# Patient Record
Sex: FEMALE | NOT HISPANIC OR LATINO | Employment: STUDENT | URBAN - METROPOLITAN AREA
[De-identification: names, ages, dates, MRNs, and addresses within clinical notes are randomized per-mention and may not be internally consistent; named-entity substitution may affect disease eponyms.]

---

## 2021-06-09 ENCOUNTER — HOSPITAL ENCOUNTER (INPATIENT)
Facility: CLINIC | Age: 21
LOS: 6 days | Discharge: HOME | DRG: 885 | End: 2021-06-15
Attending: PSYCHIATRY & NEUROLOGY | Admitting: PSYCHIATRY & NEUROLOGY
Payer: COMMERCIAL

## 2021-06-09 DIAGNOSIS — F33.2 SEVERE RECURRENT MAJOR DEPRESSION WITHOUT PSYCHOTIC FEATURES (H): ICD-10-CM

## 2021-06-09 DIAGNOSIS — F32.A DEPRESSION WITH SUICIDAL IDEATION: ICD-10-CM

## 2021-06-09 DIAGNOSIS — F43.10 POSTTRAUMATIC STRESS DISORDER: ICD-10-CM

## 2021-06-09 DIAGNOSIS — Z11.52 ENCOUNTER FOR SCREENING LABORATORY TESTING FOR COVID-19 VIRUS: ICD-10-CM

## 2021-06-09 DIAGNOSIS — R45.851 DEPRESSION WITH SUICIDAL IDEATION: ICD-10-CM

## 2021-06-09 LAB
AMPHETAMINES UR QL SCN: NEGATIVE
BARBITURATES UR QL: NEGATIVE
BENZODIAZ UR QL: NEGATIVE
CANNABINOIDS UR QL SCN: POSITIVE
COCAINE UR QL: NEGATIVE
ETHANOL UR QL SCN: NEGATIVE
HCG UR QL: NEGATIVE
LABORATORY COMMENT REPORT: NORMAL
OPIATES UR QL SCN: NEGATIVE
SARS-COV-2 RNA RESP QL NAA+PROBE: NEGATIVE
SPECIMEN SOURCE: NORMAL

## 2021-06-09 PROCEDURE — 99284 EMERGENCY DEPT VISIT MOD MDM: CPT | Performed by: PSYCHIATRY & NEUROLOGY

## 2021-06-09 PROCEDURE — 80320 DRUG SCREEN QUANTALCOHOLS: CPT | Performed by: PSYCHIATRY & NEUROLOGY

## 2021-06-09 PROCEDURE — 250N000013 HC RX MED GY IP 250 OP 250 PS 637: Performed by: STUDENT IN AN ORGANIZED HEALTH CARE EDUCATION/TRAINING PROGRAM

## 2021-06-09 PROCEDURE — 124N000002 HC R&B MH UMMC

## 2021-06-09 PROCEDURE — 81025 URINE PREGNANCY TEST: CPT | Performed by: PSYCHIATRY & NEUROLOGY

## 2021-06-09 PROCEDURE — 80307 DRUG TEST PRSMV CHEM ANLYZR: CPT | Performed by: PSYCHIATRY & NEUROLOGY

## 2021-06-09 PROCEDURE — 99285 EMERGENCY DEPT VISIT HI MDM: CPT | Mod: 25 | Performed by: PSYCHIATRY & NEUROLOGY

## 2021-06-09 PROCEDURE — C9803 HOPD COVID-19 SPEC COLLECT: HCPCS | Performed by: PSYCHIATRY & NEUROLOGY

## 2021-06-09 PROCEDURE — 90791 PSYCH DIAGNOSTIC EVALUATION: CPT

## 2021-06-09 PROCEDURE — 87635 SARS-COV-2 COVID-19 AMP PRB: CPT | Performed by: PSYCHIATRY & NEUROLOGY

## 2021-06-09 RX ORDER — LEVONORGESTREL AND ETHINYL ESTRADIOL 0.15-0.03
1 KIT ORAL DAILY
Status: DISCONTINUED | OUTPATIENT
Start: 2021-06-10 | End: 2021-06-15 | Stop reason: HOSPADM

## 2021-06-09 RX ORDER — HYDROXYZINE HYDROCHLORIDE 25 MG/1
25 TABLET, FILM COATED ORAL EVERY 4 HOURS PRN
Status: DISCONTINUED | OUTPATIENT
Start: 2021-06-09 | End: 2021-06-15 | Stop reason: HOSPADM

## 2021-06-09 RX ORDER — LEVONORGESTREL AND ETHINYL ESTRADIOL 0.15-0.03
1 KIT ORAL DAILY
COMMUNITY
End: 2021-07-13

## 2021-06-09 RX ORDER — RAMELTEON 8 MG/1
8 TABLET ORAL AT BEDTIME
COMMUNITY

## 2021-06-09 RX ORDER — RAMELTEON 8 MG/1
8 TABLET ORAL AT BEDTIME
Status: DISCONTINUED | OUTPATIENT
Start: 2021-06-09 | End: 2021-06-15 | Stop reason: HOSPADM

## 2021-06-09 RX ORDER — ACETAMINOPHEN 325 MG/1
650 TABLET ORAL EVERY 4 HOURS PRN
Status: DISCONTINUED | OUTPATIENT
Start: 2021-06-09 | End: 2021-06-15 | Stop reason: HOSPADM

## 2021-06-09 RX ORDER — POLYETHYLENE GLYCOL 3350 17 G/17G
17 POWDER, FOR SOLUTION ORAL DAILY PRN
Status: DISCONTINUED | OUTPATIENT
Start: 2021-06-09 | End: 2021-06-15 | Stop reason: HOSPADM

## 2021-06-09 RX ADMIN — RAMELTEON 8 MG: 8 TABLET ORAL at 22:25

## 2021-06-09 ASSESSMENT — MIFFLIN-ST. JEOR: SCORE: 1622.28

## 2021-06-09 ASSESSMENT — ENCOUNTER SYMPTOMS
ENDOCRINE NEGATIVE: 1
DECREASED CONCENTRATION: 1
CARDIOVASCULAR NEGATIVE: 1
CONSTITUTIONAL NEGATIVE: 1
MUSCULOSKELETAL NEGATIVE: 1
GASTROINTESTINAL NEGATIVE: 1
NERVOUS/ANXIOUS: 1
SLEEP DISTURBANCE: 1
HALLUCINATIONS: 0
RESPIRATORY NEGATIVE: 1
HYPERACTIVE: 0
NEUROLOGICAL NEGATIVE: 1
EYES NEGATIVE: 1

## 2021-06-09 ASSESSMENT — ACTIVITIES OF DAILY LIVING (ADL)
LAUNDRY: WITH SUPERVISION
DRESS: SCRUBS (BEHAVIORAL HEALTH);INDEPENDENT
ORAL_HYGIENE: INDEPENDENT
HYGIENE/GROOMING: INDEPENDENT

## 2021-06-09 NOTE — ED NOTES
Per ems, patient is having suicidal thoughts. Having relationship issues. Was on a virtual visit with therapist when she told therapist about her thoughts

## 2021-06-09 NOTE — ED NOTES
ED to Behavioral Floor Handoff    SITUATION  Jacklyn Ho is a 20 year old female who speaks English and lives in a home with others The patient arrived in the ED by ambulance from home with a complaint of Suicidal  .The patient's current symptoms started/worsened 2 week(s) ago and during this time the symptoms have increased.   In the ED, pt was diagnosed with   Final diagnoses:   Depression with suicidal ideation        Initial vitals were: BP: 127/74  Pulse: 86  Temp: 98.1  F (36.7  C)  Resp: 18  SpO2: 100 %   --------  Is the patient diabetic? No   If yes, last blood glucose? --     If yes, was this treated in the ED? --  --------  Is the patient inebriated (ETOH) No or Impaired on other substances? No  MSSA done? N/A  Last MSSA score: --    Were withdrawal symptoms treated? N/A  Does the patient have a seizure history? No. If yes, date of most recent seizure--  --------  Is the patient patient experiencing suicidal ideation? SI, could not contract for safety with therapist; sleep problems; hx of suicide attempts in past  Homicidal ideation? denies current or recent homicidal ideation or behaviors.    Self-injurious behavior/urges? denies current or recent self injurious behavior or ideation.  ------  Was pt aggressive in the ED No  Was a code called No  Is the pt now cooperative? Yes  -------  Meds given in ED: Medications - No data to display   Family present during ED course? Yes  Family currently present? No    BACKGROUND  Does the patient have a cognitive impairment or developmental disability? No  Allergies: No Known Allergies.   Social demographics are   Social History     Socioeconomic History     Marital status: Single     Spouse name: Not on file     Number of children: Not on file     Years of education: Not on file     Highest education level: Not on file   Occupational History     Not on file   Social Needs     Financial resource strain: Not on file     Food insecurity     Worry: Not on file      Inability: Not on file     Transportation needs     Medical: Not on file     Non-medical: Not on file   Tobacco Use     Smoking status: Not on file   Substance and Sexual Activity     Alcohol use: Not on file     Drug use: Not on file     Sexual activity: Not on file   Lifestyle     Physical activity     Days per week: Not on file     Minutes per session: Not on file     Stress: Not on file   Relationships     Social connections     Talks on phone: Not on file     Gets together: Not on file     Attends Restoration service: Not on file     Active member of club or organization: Not on file     Attends meetings of clubs or organizations: Not on file     Relationship status: Not on file     Intimate partner violence     Fear of current or ex partner: Not on file     Emotionally abused: Not on file     Physically abused: Not on file     Forced sexual activity: Not on file   Other Topics Concern     Not on file   Social History Narrative     Not on file        ASSESSMENT  Labs results   Labs Ordered and Resulted from Time of ED Arrival Up to the Time of Departure from the ED   SARS-COV-2 (COVID-19) VIRUS RT-PCR   DRUG ABUSE SCREEN 6 CHEM DEP URINE ([facility])   HCG QUALITATIVE URINE      Imaging Studies: No results found for this or any previous visit (from the past 24 hour(s)).   Most recent vital signs /74   Pulse 86   Temp 98.1  F (36.7  C) (Oral)   Resp 18   LMP 06/09/2021   SpO2 100%    Abnormal labs/tests/findings requiring intervention:---   Pain control: pt had none  Nausea control: pt had none    RECOMMENDATION  Are any infection precautions needed (MRSA, VRE, etc.)? No If yes, what infection? --  ---  Does the patient have mobility issues? independently. If yes, what device does the pt use? ---  ---  Is patient on 72 hour hold or commitment? No If on 72 hour hold, have hold and rights been given to patient? N/A  Are admitting orders written if after 10 p.m. ?N/A  Tasks needing to be completed:---      Shruthi Gaming RN   [facility]--    [id number] [facility]

## 2021-06-09 NOTE — ED PROVIDER NOTES
ED Provider Note  [facility]      History     Chief Complaint   Patient presents with     Suicidal     HPI  Jacklyn Ho is a 20 year old female who is here via EMS from her dorm at [employer] as she told her therapist that she was feeling suicidal and cannot safety contract. Patient reports feeling overwhelmed, that she has no support here. She comes from [place] to attend [employer]. Due to the Covid pandemic and need for social distancing, she has never been able to connect to her roommates and feels that they are strangers to her. She has a boyfriend. Patient has not bene hospitalized previously. She has been diagnosed with depression and anxiety by her current therapist/PA provider. She admits to seeing shadows and things on the periphery of her vision otherwise denies hearing voices or other psychotic concerns. Patient denies drug use or drinking. She presently feels unsafe being in the community and would like to be hospitalized.    Patient denies acute medical concerns. She denies COVID symptoms and had her first Covid shot ([place]) 3 weeks ago and is due for her second shot in 3 days.    Please see DEC Crisis Assessment on 6/9/21 in Epic for further details.     PERSONAL MEDICAL HISTORY  No past medical history on file.  PAST SURGICAL HISTORY  No past surgical history on file.  FAMILY HISTORY  No family history on file.  SOCIAL HISTORY  Social History     Tobacco Use     Smoking status: Not on file   Substance Use Topics     Alcohol use: Not on file     MEDICATIONS  No current facility-administered medications for this encounter.      No current outpatient medications on file.     ALLERGIES  No Known Allergies       Review of Systems   Constitutional: Negative.    HENT: Negative.    Eyes: Negative.    Respiratory: Negative.    Cardiovascular: Negative.    Gastrointestinal: Negative.    Endocrine: Negative.    Genitourinary: Negative.    Musculoskeletal:  Negative.    Skin: Negative.    Neurological: Negative.    Psychiatric/Behavioral: Positive for decreased concentration, sleep disturbance and suicidal ideas. Negative for hallucinations. The patient is nervous/anxious. The patient is not hyperactive.    All other systems reviewed and are negative.        Physical Exam   BP: 127/74  Pulse: 86  Temp: 98.1  F (36.7  C)  Resp: 18  SpO2: 100 %  Physical Exam  Vitals signs and nursing note reviewed.   HENT:      Head: Normocephalic.   Eyes:      Pupils: Pupils are equal, round, and reactive to light.   Neck:      Musculoskeletal: Normal range of motion.   Pulmonary:      Effort: Pulmonary effort is normal.   Musculoskeletal: Normal range of motion.   Neurological:      General: No focal deficit present.      Mental Status: She is alert.   Psychiatric:         Attention and Perception: Attention and perception normal. She does not perceive auditory or visual hallucinations.         Mood and Affect: Affect normal. Mood is anxious and depressed.         Speech: Speech normal.         Behavior: Behavior is withdrawn. Behavior is not agitated, aggressive, hyperactive or combative. Behavior is cooperative.         Thought Content: Thought content is not paranoid or delusional. Thought content includes suicidal ideation. Thought content does not include homicidal ideation.         Cognition and Memory: Cognition and memory normal.         Judgment: Judgment normal.         ED Course      Procedures             No results found for any visits on 06/09/21.  Medications - No data to display     Assessments & Plan (with Medical Decision Making)   Patient with depression who is feeling overwhelmed and now acutely suicidal and unsafe. She feels helpless, hopeless and feels she needs to be hospitalized. She is referred. She is voluntary.    I have reviewed the nursing notes. I have reviewed the findings, diagnosis, plan and need for follow up with the patient.    New Prescriptions     No medications on file       Final diagnoses:   Depression with suicidal ideation       --  Johnny Henderson MD  [facility] EMERGENCY DEPARTMENT  6/9/2021     Johnny Henderson MD  06/09/21 3429

## 2021-06-09 NOTE — PHARMACY-ADMISSION MEDICATION HISTORY
Admission Medication History Completed by Pharmacy    See [facility] Admission Navigator for allergy information, preferred outpatient pharmacy, prior to admission medications and immunization status.     Medication History Sources:     Patient, Dispense Report, Care Everywhere    Changes made to PTA medication list (reason):    Added:     Nordette (per Care Everywhere, confirmed with patient)    Ramelteon (per Dispense Report, confirmed with patient)    Sertraline (per Dispense Report, confirmed with patient)    Deleted: None    Changed: None    Additional Information:    Patient is knowledgeable of medication use. Patient also notes that she was recently prescribed a ketoconazole 2% shampoo, but has not yet picked it up.    Prior to Admission medications    Medication Sig Last Dose Taking? Auth Provider   levonorgestrel-ethinyl estradiol (NORDETTE) 0.15-30 MG-MCG tablet Take 1 tablet by mouth daily 6/8/2021 Yes Unknown, Entered By History   ramelteon (ROZEREM) 8 MG tablet Take 8 mg by mouth At Bedtime 6/8/2021 at PM Yes Unknown, Entered By History   sertraline (ZOLOFT) 50 MG tablet Take 50 mg by mouth daily 6/9/2021 at AM Yes Unknown, Entered By History       Date completed: 06/09/21    Medication history completed by: Oscar Brewer

## 2021-06-09 NOTE — ED TRIAGE NOTES
Pt told her therapist via zoom that the was feeling suicidal thoughts but denied any attempts.  Pt states she recently has been having relationship issues and its making her feeling this way.

## 2021-06-09 NOTE — ED NOTES
Pt arrived in [facility], RN explained the process. Pt calm, cooperative at this time. Given sandwich and some snacks, reports she has not eaten for two days
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
## 2021-06-09 NOTE — ED NOTES
Bed: ED16  Expected date: 6/9/21  Expected time: 2:10 PM  Means of arrival: Ambulance  Comments:  .[employer], 19yo female suicidal

## 2021-06-10 LAB
ANION GAP SERPL CALCULATED.3IONS-SCNC: 5 MMOL/L (ref 3–14)
BUN SERPL-MCNC: 9 MG/DL (ref 7–30)
CALCIUM SERPL-MCNC: 9.2 MG/DL (ref 8.5–10.1)
CHLORIDE SERPL-SCNC: 110 MMOL/L (ref 94–109)
CO2 SERPL-SCNC: 26 MMOL/L (ref 20–32)
CREAT SERPL-MCNC: 0.99 MG/DL (ref 0.52–1.04)
ERYTHROCYTE [DISTWIDTH] IN BLOOD BY AUTOMATED COUNT: 11.9 % (ref 10–15)
GFR SERPL CREATININE-BSD FRML MDRD: 82 ML/MIN/{1.73_M2}
GLUCOSE SERPL-MCNC: 85 MG/DL (ref 70–99)
HCT VFR BLD AUTO: 40.7 % (ref 35–47)
HGB BLD-MCNC: 13.5 G/DL (ref 11.7–15.7)
MCH RBC QN AUTO: 30.6 PG (ref 26.5–33)
MCHC RBC AUTO-ENTMCNC: 33.2 G/DL (ref 31.5–36.5)
MCV RBC AUTO: 92 FL (ref 78–100)
PLATELET # BLD AUTO: 267 10E9/L (ref 150–450)
POTASSIUM SERPL-SCNC: 4.3 MMOL/L (ref 3.4–5.3)
RBC # BLD AUTO: 4.41 10E12/L (ref 3.8–5.2)
SODIUM SERPL-SCNC: 141 MMOL/L (ref 133–144)
TSH SERPL DL<=0.005 MIU/L-ACNC: 0.72 MU/L (ref 0.4–4)
VIT B12 SERPL-MCNC: 649 PG/ML (ref 193–986)
WBC # BLD AUTO: 5.4 10E9/L (ref 4–11)

## 2021-06-10 PROCEDURE — 82607 VITAMIN B-12: CPT | Performed by: STUDENT IN AN ORGANIZED HEALTH CARE EDUCATION/TRAINING PROGRAM

## 2021-06-10 PROCEDURE — 85027 COMPLETE CBC AUTOMATED: CPT | Performed by: STUDENT IN AN ORGANIZED HEALTH CARE EDUCATION/TRAINING PROGRAM

## 2021-06-10 PROCEDURE — 250N000013 HC RX MED GY IP 250 OP 250 PS 637: Performed by: STUDENT IN AN ORGANIZED HEALTH CARE EDUCATION/TRAINING PROGRAM

## 2021-06-10 PROCEDURE — 124N000002 HC R&B MH UMMC

## 2021-06-10 PROCEDURE — 80048 BASIC METABOLIC PNL TOTAL CA: CPT | Performed by: STUDENT IN AN ORGANIZED HEALTH CARE EDUCATION/TRAINING PROGRAM

## 2021-06-10 PROCEDURE — G0177 OPPS/PHP; TRAIN & EDUC SERV: HCPCS

## 2021-06-10 PROCEDURE — 84443 ASSAY THYROID STIM HORMONE: CPT | Performed by: STUDENT IN AN ORGANIZED HEALTH CARE EDUCATION/TRAINING PROGRAM

## 2021-06-10 PROCEDURE — 36415 COLL VENOUS BLD VENIPUNCTURE: CPT | Performed by: STUDENT IN AN ORGANIZED HEALTH CARE EDUCATION/TRAINING PROGRAM

## 2021-06-10 PROCEDURE — 99223 1ST HOSP IP/OBS HIGH 75: CPT | Mod: AI | Performed by: PSYCHIATRY & NEUROLOGY

## 2021-06-10 RX ADMIN — LEVONORGESTREL AND ETHINYL ESTRADIOL 1 TABLET: KIT at 09:46

## 2021-06-10 RX ADMIN — RAMELTEON 8 MG: 8 TABLET ORAL at 20:53

## 2021-06-10 RX ADMIN — SERTRALINE HYDROCHLORIDE 50 MG: 50 TABLET ORAL at 09:45

## 2021-06-10 RX ADMIN — HYDROXYZINE HYDROCHLORIDE 25 MG: 25 TABLET, FILM COATED ORAL at 10:33

## 2021-06-10 ASSESSMENT — ACTIVITIES OF DAILY LIVING (ADL)
LAUNDRY: WITH SUPERVISION
HYGIENE/GROOMING: INDEPENDENT
DRESS: SCRUBS (BEHAVIORAL HEALTH)
ORAL_HYGIENE: INDEPENDENT
HYGIENE/GROOMING: INDEPENDENT
DRESS: SCRUBS (BEHAVIORAL HEALTH)
ORAL_HYGIENE: INDEPENDENT

## 2021-06-10 ASSESSMENT — MIFFLIN-ST. JEOR: SCORE: 1620.01

## 2021-06-10 NOTE — PROGRESS NOTES
"   06/10/21 1310   General Information   Date Initially Attended OT 06/10/21     Date of Service: Dee 10, 2021    Description: Jacklyn attended 1 occupational therapy group today.   11:15 - 12:00. 7 total participants. Occupational Therapy Clinic. Purpose: Coping skill exploration, creative expression within personally meaningful activities, and clinical observation of social, cognitive, and kinesthetic performance skills.  Pt Response: Chosen activity: Creative expression painting. Stated \"I like to paint because it doesn't require any skills\". Congruent-pleasant affect. I to initiate, gather materials, sequence and adjust to workspace demands as needed. Demonstrated good focus, planning, and problem solving for this moderately complex task. Able to ask for assistance and appeared comfortable interacting with peers and staff.    Assessment: Demonstrates benefit from engagement in OT groups that support healthy recovery, specifically exploration of positive coping skills for symptom management, relapse prevention, and resumption of personal roles, routines and daily occupations.    Plan: OT staff will meet with pt to review the role of occupational therapy and explain the value of having them involved in their treatment plan including options to meet current needs/self-identified goals. As group attendance is established,  continued clinical observations will be made and Pt will be given self-assessment to inform OT initial assessment. Continue graded occupation-based activities for increased success towards goal and ongoing assessment.     Aneta Figueroa, OT on 6/10/2021 at 1:10 PM    "

## 2021-06-10 NOTE — PLAN OF CARE
The patient presents as calm,soft spoken and generally passive this morning. She took her morning medications without any issues and attended groups appropriately. At one point she indicated escalating anxiety and requested PRN hydroxyzine. She expressed that she would also work on coping skills for dealing with the stress of hospitalization. The patient is otherwise calm, appropriate and engaged on the unit throughout the day. She indicates poor sleep as a major concern and notes she often has trouble sleeping in new environments. She denies SI/SIB/HI today. She denies any acute physical health concerns or side effects from medications. Her VS were WDL this shift.

## 2021-06-10 NOTE — PROGRESS NOTES
Behavioral Health  Note   Behavioral Health  Spirituality Group Note     Unit 20    Name: Jacklyn Ho    YOB: 2000   MRN: [id number]    Age: 20 year old     Patient attended -led group, which included discussion of spirituality, coping with illness and building resilience.   Patient attended group for 1.0 hrs.   patient demonstrated an appreciation of topic's application for their personal circumstances.     [facility]  Staff    Page [phone]
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
## 2021-06-10 NOTE — PLAN OF CARE
Problem: Behavioral Health Plan of Care  Goal: Patient-Specific Goal (Individualization)  Flowsheets (Taken 6/10/2021 0954)  Patient Vulnerabilities:   adverse childhood experience(s)   traumatic event   lacks insight into illness  Patient Personal Strengths:   ability to maintain sobriety   motivated for recovery   independent living skills   realistic evaluation of current/future capabilities   intellectual cognitive skills   expressive of emotions   positive educational history   family/social support  Note:   Personal Plan of Care    Patient goals:  Feel stable and safe with self   Get referrals for outpatient care    Plan for admission:  1. stabilization of mood disorder symptoms.   2. Absence of SI/Safe with self  3. Medication management per Mds  4. Coordination of Care with outpatient providers/family  5. Psychiatric follow up care in place

## 2021-06-10 NOTE — PLAN OF CARE
BEHAVIORAL TEAM DISCUSSION    Participants: Dr. Dunham, Psychiatry Student, Med Student, Robin ESCOBAR, Ninoska Waller CTC  Progress: New Patient   Anticipated length of stay: 3-5 days  Continued Stay Criteria/Rationale: SI, needs clinical stabilization and medication management.  Medical/Physical: See H&P note  Precautions:   Behavioral Orders   Procedures    Code 1 - Restrict to Unit    Discontinue 1:1 attendant for suicide risk     Order Specific Question:   I have performed an in person assessment of the patient     Answer:   Based on this assessment the patient no longer requires a one on one attendant at this point in time.     Order Specific Question:   Rationale     Answer:   Patient States able to remain safe in hospital    Routine Programming     As clinically indicated    Single Room    Status 15     Every 15 minutes.    Suicide precautions     Patients on Suicide Precautions should have a Combination Diet ordered that includes a Diet selection(s) AND a Behavioral Tray selection for Safe Tray - with utensils, or Safe Tray - NO utensils       Plan: Patient will have ongoing psychiatric assessment. Medications will be reviewed and adjusted per MD as indicated. Outpatient providers will be contacted for care coordination. Patient will be encouraged to participate in unit groups and activities. CTC will continue to assess needs and  ensure appropriate follow up care is in place.   Rationale for change in precautions or plan: None

## 2021-06-10 NOTE — PROGRESS NOTES
06/09/21 2053   Patient Belongings   Did you bring any home meds/supplements to the hospital?  No   Patient Belongings locker;other (see comments)   Patient Belongings Remaining with Patient other (see comments)   Patient Belongings Put in Hospital Secure Location (Security or Locker, etc.) other (see comments)   Belongings Search Yes   Clothing Search Yes   Second Staff Adwoa HA (PsyA) and Sana (Rn) unit 20N       Pt belongings in locker on unit 20N  1 shirt, 1 pair shorts, 1 pair sweatpants, 1 pair adidas slides, 1 pair underwear, 1 mask, 3 hairties, 1 iphone, 1 student ID, 1 lanyard with 4 keys, 1 self defense stick,1 ring      .A               Admission:  I am responsible for any personal items that are not sent to the safe or pharmacy.  [facility] is not responsible for loss, theft or damage of any property in my possession.    Signature:  _________________________________ Date: _______  Time: _____                                              Staff Signature:  ____________________________ Date: ________  Time: _____      2nd Staff person, if patient is unable/unwilling to sign:    Signature: ________________________________ Date: ________  Time: _____     Discharge:  [facility] has returned all of my personal belongings:    Signature: _________________________________ Date: ________  Time: _____                                          Staff Signature:  ____________________________ Date: ________  Time: _____
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
## 2021-06-10 NOTE — PLAN OF CARE
Jacklyn appeared to sleep a total of 7 hours this night shift.  No prns or snacks given or requested.
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
## 2021-06-11 PROCEDURE — 250N000013 HC RX MED GY IP 250 OP 250 PS 637: Performed by: STUDENT IN AN ORGANIZED HEALTH CARE EDUCATION/TRAINING PROGRAM

## 2021-06-11 PROCEDURE — 250N000013 HC RX MED GY IP 250 OP 250 PS 637: Performed by: PSYCHIATRY & NEUROLOGY

## 2021-06-11 PROCEDURE — G0177 OPPS/PHP; TRAIN & EDUC SERV: HCPCS

## 2021-06-11 PROCEDURE — 124N000002 HC R&B MH UMMC

## 2021-06-11 PROCEDURE — 99232 SBSQ HOSP IP/OBS MODERATE 35: CPT | Mod: GC | Performed by: PSYCHIATRY & NEUROLOGY

## 2021-06-11 RX ORDER — FLUOXETINE 10 MG/1
10 CAPSULE ORAL DAILY
Status: DISCONTINUED | OUTPATIENT
Start: 2021-06-11 | End: 2021-06-15 | Stop reason: HOSPADM

## 2021-06-11 RX ADMIN — LEVONORGESTREL AND ETHINYL ESTRADIOL 1 TABLET: KIT at 08:01

## 2021-06-11 RX ADMIN — RAMELTEON 8 MG: 8 TABLET ORAL at 20:10

## 2021-06-11 RX ADMIN — HYDROXYZINE HYDROCHLORIDE 25 MG: 25 TABLET, FILM COATED ORAL at 13:21

## 2021-06-11 RX ADMIN — FLUOXETINE 10 MG: 10 CAPSULE ORAL at 13:20

## 2021-06-11 RX ADMIN — SERTRALINE HYDROCHLORIDE 50 MG: 50 TABLET ORAL at 08:01

## 2021-06-11 ASSESSMENT — ACTIVITIES OF DAILY LIVING (ADL)
LAUNDRY: WITH SUPERVISION
HYGIENE/GROOMING: INDEPENDENT
DRESS: INDEPENDENT
HYGIENE/GROOMING: INDEPENDENT
ORAL_HYGIENE: INDEPENDENT
ORAL_HYGIENE: INDEPENDENT
DRESS: INDEPENDENT

## 2021-06-11 NOTE — CONSULTS
Consult acknowledged. Discussed with CTC.   Patient is able to complete assessment as an Outpatient appointment. CTC will call Intake at [phone] to schedule an appointment.        SELMA Cabezas, [facility]  [facility]  Phone: [phone]

## 2021-06-11 NOTE — PLAN OF CARE
Assessment/Intervention/Current Symptoms and Care Coordination: Met with team. Reviewed patient's chart and progress notes. Writer called [facility] at  [phone]. Writer left a vm requesting a call back. Patient was referred to the [facility]. Writer was informed by [facility] , Lucrecia, that Intake appointment will be completed as outpatient.          Discharge Plan or Goal: Will return home. Will follow up with outpatient providers.          Barriers to Discharge: Needs clinical stabilization and medication management.               Referral Status: Was referred to the [facility].          Legal Status: Voluntary

## 2021-06-11 NOTE — PROGRESS NOTES
Psychoeducation Group  Total Time in Group: 45 minutes    Number of patients in group: 5     Scope of service: Use of humor as a coping technique with distress. Pt engaged throughout the intervention. Mood: Flat. Pt reported she was anxious and tired. She reported she slept this afternoon, and it might affect her night sleep. Affect was flat. Denied suicidal ideation. Denied thoughts to harm self. She left before the group was over, immediately after another patient walked into the group room.     Patient progress: Pt voiced understanding of strategies and techniques discussed.      Patient response/reaction to treatment intervention(s): This patient attended this group session focused on use of appropriate humor as a coping tool with distress. Practiced ways to apply humor to specific situations. Discussed how humor helps distract her from anxiety and other tense emotions. Received positive feedback from writer and group members. Pt did not voice understanding of the techniques, as she left the group immediately another male patient came into the room.

## 2021-06-11 NOTE — PLAN OF CARE
Denies suicidal thoughts and thoughts of self harm.  C/o increasing anxiety after lunch today.  Requested and received PRN Hydroxyzine with decrease in anxiety.  Attended groups today, social with peers, affect flat, brightens on approach.  Has been making and receiving multiple phone calls throughout the day. Pleasant and cooperative.     Problem: Suicide Risk  Goal: Absence of Self-Harm  Outcome: No Change     Problem: Suicidal Behavior  Goal: Suicidal Behavior is Absent or Managed  Outcome: No Change

## 2021-06-11 NOTE — PLAN OF CARE
Date of Service: June 11, 2021    Description: Jacklyn attended 3 hours of  occupational therapy groups today.Overall, pt was pleasant and presented with a brightened affect.    10:15 - 12:00. 8 total participants. Occupational Therapy Clinic. Purpose: Coping skill exploration, creative expression within personally meaningful activities, and clinical observation of social, cognitive, and kinesthetic performance skills. Pt Response: Chosen activity: created a water color painting and made a beaded bracelet. Demonstrated consistent performance and social skills as observed on previous dates, including engaging with peers and fair sequencing.      1:15 - 2:00. 6 total participants.Collaborative multi-step hands-on meal preparation group. Education was provided on cooking/baking as a significant occupation for role and routine fulfillment, delayed gratification and socialization.  Pt Response: Reportedly enjoys baking for friends and family. Demonstrated fair process, performance, and collaborative social interaction skills, specifically leadership and inclusion of peers.    Assessment: Demonstrates benefit from engagement in OT groups that support healthy recovery, specifically exploration of positive coping skills for symptom management, relapse prevention, and resumption of personal roles, routines and daily occupations.    Plan: Continue graded occupation-based activities for increased success towards goal and ongoing assessment.     Linda Mondragon on 6/11/2021 at 2:47 PM    Supervising Occupational Therapist:  Aneta Figueroa OT on 6/11/2021 at 3:03 PM

## 2021-06-11 NOTE — PLAN OF CARE
Problem: Behavioral Health Plan of Care  Goal: Plan of Care Review  Outcome: Improving  Flowsheets (Taken 6/11/2021 1600)  Plan of Care Reviewed With: patient  Patient Agreement with Plan of Care: agrees  Goal: Adheres to Safety Considerations for Self and Others  Outcome: Improving  Goal: Absence of New-Onset Illness or Injury  Outcome: Improving  Goal: Optimized Coping Skills in Response to Life Stressors  Outcome: Improving     Patient in room sleeping when received this shift, pleasant on approach, denies anxiety and depression, no SI/SIB/hallucinations, denies pain, deana for safety, safety checks in place every 15 minutes per unit policy,eating and drinking well, had a visitor and visit went well,  offers no other concerns at this time, will continue to offer support as needed per plan of care

## 2021-06-11 NOTE — PLAN OF CARE
Mauri appeared to sleep a total of 7 hours this night shift.  No prns or snacks given or requested.

## 2021-06-12 PROCEDURE — 124N000002 HC R&B MH UMMC

## 2021-06-12 PROCEDURE — 250N000013 HC RX MED GY IP 250 OP 250 PS 637: Performed by: STUDENT IN AN ORGANIZED HEALTH CARE EDUCATION/TRAINING PROGRAM

## 2021-06-12 PROCEDURE — 250N000013 HC RX MED GY IP 250 OP 250 PS 637: Performed by: PSYCHIATRY & NEUROLOGY

## 2021-06-12 RX ORDER — LANOLIN ALCOHOL/MO/W.PET/CERES
6 CREAM (GRAM) TOPICAL
Status: DISCONTINUED | OUTPATIENT
Start: 2021-06-12 | End: 2021-06-15 | Stop reason: HOSPADM

## 2021-06-12 RX ADMIN — RAMELTEON 8 MG: 8 TABLET ORAL at 20:42

## 2021-06-12 RX ADMIN — MELATONIN TAB 3 MG 6 MG: 3 TAB at 20:42

## 2021-06-12 RX ADMIN — FLUOXETINE 10 MG: 10 CAPSULE ORAL at 07:56

## 2021-06-12 RX ADMIN — LEVONORGESTREL AND ETHINYL ESTRADIOL 1 TABLET: KIT at 07:56

## 2021-06-12 RX ADMIN — HYDROXYZINE HYDROCHLORIDE 25 MG: 25 TABLET, FILM COATED ORAL at 20:42

## 2021-06-12 ASSESSMENT — ACTIVITIES OF DAILY LIVING (ADL)
DRESS: INDEPENDENT
LAUNDRY: WITH SUPERVISION
ORAL_HYGIENE: INDEPENDENT
HYGIENE/GROOMING: INDEPENDENT
HYGIENE/GROOMING: INDEPENDENT
ORAL_HYGIENE: INDEPENDENT
DRESS: INDEPENDENT

## 2021-06-12 NOTE — PLAN OF CARE
Pt attended mental health education group. Gave group options regarding topic, the group chose to do random questions. Discussed multiple topics, most common was their relationship to others and connection level, regrets and goals.     Pt actively participated, expressed future orientation.

## 2021-06-12 NOTE — PLAN OF CARE
Problem: Adult Inpatient Plan of Care  Goal: Absence of Hospital-Acquired Illness or Injury  Outcome: Improving  Goal: Optimal Comfort and Wellbeing  Outcome: Improving     Problem: Suicide Risk  Goal: Absence of Self-Harm  Outcome: Improving     Patient up and visible on the unit when received this shift, playing cards with peers, pleasant on approach, endorsed anxiety and depression, medicated with PRN Atarax, no SI/SIB/hallucination, denies pain, deana for safety, safety precautions in place every 15 minutes, eating and drinking well, melatonin PRN given per patient's request, patient has no other known concerns at his time, will offer support as needed per plan of care

## 2021-06-12 NOTE — PLAN OF CARE
Visible in the milieu, social with peers. Denies suicidal thoughts, thoughts of self ham and hallucinations.  Reports mood as being good. Affect flat, brightens with social interactions. Full range affect this afternoon after visiting with a her boyfriend.   Reports difficulty staying asleep at night, reports wakes up frequently throughout the night.  States Rozerem helps her fall asleep, requesting to have Melatonin added to help her stay asleep.  On call care team informed.    Problem: Suicide Risk  Goal: Absence of Self-Harm  Outcome: No Change     Problem: Suicidal Behavior  Goal: Suicidal Behavior is Absent or Managed  Outcome: No Change

## 2021-06-13 PROCEDURE — 250N000013 HC RX MED GY IP 250 OP 250 PS 637: Performed by: PSYCHIATRY & NEUROLOGY

## 2021-06-13 PROCEDURE — 250N000013 HC RX MED GY IP 250 OP 250 PS 637: Performed by: STUDENT IN AN ORGANIZED HEALTH CARE EDUCATION/TRAINING PROGRAM

## 2021-06-13 PROCEDURE — 124N000002 HC R&B MH UMMC

## 2021-06-13 RX ADMIN — MELATONIN TAB 3 MG 6 MG: 3 TAB at 21:19

## 2021-06-13 RX ADMIN — FLUOXETINE 10 MG: 10 CAPSULE ORAL at 07:51

## 2021-06-13 RX ADMIN — LEVONORGESTREL AND ETHINYL ESTRADIOL 1 TABLET: KIT at 07:52

## 2021-06-13 RX ADMIN — RAMELTEON 8 MG: 8 TABLET ORAL at 21:19

## 2021-06-13 ASSESSMENT — ACTIVITIES OF DAILY LIVING (ADL)
ORAL_HYGIENE: INDEPENDENT
HYGIENE/GROOMING: INDEPENDENT
LAUNDRY: WITH SUPERVISION
DRESS: INDEPENDENT;SCRUBS (BEHAVIORAL HEALTH)
HYGIENE/GROOMING: INDEPENDENT
DRESS: INDEPENDENT;SCRUBS (BEHAVIORAL HEALTH)
ORAL_HYGIENE: INDEPENDENT

## 2021-06-13 ASSESSMENT — MIFFLIN-ST. JEOR: SCORE: 1620.01

## 2021-06-13 NOTE — PLAN OF CARE
"Denies suicidal thoughts and thoughts of self harm. Has full range affect, calm and cooperative.   Reports anxiety and depression improving.  Rates anxiety 5/10 and depression 6/10.  Reports sleeping better last night, states she feels the \"quality\" of sleep improved with the added Melatonin.  States she still woke up several times but was able to fall back to sleep quickly.   Has been out in the milieu, social with peers, talking on the phone, watching movies and coloring.     Problem: Adult Inpatient Plan of Care  Goal: Plan of Care Review  Outcome: Improving  Flowsheets (Taken 6/13/2021 1047)  Plan of Care Reviewed With: patient  Goal: Patient-Specific Goal (Individualized)  Outcome: Improving  Goal: Absence of Hospital-Acquired Illness or Injury  Outcome: Improving  Goal: Optimal Comfort and Wellbeing  Outcome: Improving  Goal: Readiness for Transition of Care  Outcome: Improving     Problem: Suicide Risk  Goal: Absence of Self-Harm  Outcome: Improving     Problem: Behavioral Health Plan of Care  Goal: Plan of Care Review  Outcome: Improving  Flowsheets (Taken 6/13/2021 1047)  Plan of Care Reviewed With: patient  Patient Agreement with Plan of Care: agrees  Goal: Patient-Specific Goal (Individualization)  Outcome: Improving  Note:     Goal: Adheres to Safety Considerations for Self and Others  Outcome: Improving  Goal: Absence of New-Onset Illness or Injury  Outcome: Improving  Goal: Optimized Coping Skills in Response to Life Stressors  Outcome: Improving  Goal: Develops/Participates in Therapeutic [place] to Support Successful Transition  Outcome: Improving     Problem: Suicidal Behavior  Goal: Suicidal Behavior is Absent or Managed  Outcome: Improving     "

## 2021-06-13 NOTE — PLAN OF CARE
Problem: Behavioral Health Plan of Care  Goal: Plan of Care Review  Outcome: Improving  Flowsheets (Taken 6/13/2021 1600)  Plan of Care Reviewed With: patient  Patient Agreement with Plan of Care: agrees  Goal: Adheres to Safety Considerations for Self and Others  Outcome: Improving  Goal: Absence of New-Onset Illness or Injury  Outcome: Improving  Goal: Optimized Coping Skills in Response to Life Stressors  Outcome: Improving     Patient in room when received this shift, pleasant on approach, denies all psych symptoms, no SI/SIB/hallucinations, patient verbalized to contract for safety, safety precautions in place every 15 minutes, denies  pain, medication compliant, eating and drinking well, engaged and social with peers in the milieu, no other known concerns at this time, will continue to offer support as needed for the rest of the shift.

## 2021-06-14 PROCEDURE — 250N000013 HC RX MED GY IP 250 OP 250 PS 637: Performed by: STUDENT IN AN ORGANIZED HEALTH CARE EDUCATION/TRAINING PROGRAM

## 2021-06-14 PROCEDURE — 124N000002 HC R&B MH UMMC

## 2021-06-14 PROCEDURE — 99232 SBSQ HOSP IP/OBS MODERATE 35: CPT | Mod: GC | Performed by: PSYCHIATRY & NEUROLOGY

## 2021-06-14 PROCEDURE — 250N000013 HC RX MED GY IP 250 OP 250 PS 637: Performed by: PSYCHIATRY & NEUROLOGY

## 2021-06-14 PROCEDURE — G0177 OPPS/PHP; TRAIN & EDUC SERV: HCPCS

## 2021-06-14 RX ORDER — FLUOXETINE 10 MG/1
10 CAPSULE ORAL DAILY
Qty: 30 CAPSULE | Refills: 0 | Status: ON HOLD | OUTPATIENT
Start: 2021-06-15 | End: 2021-07-20

## 2021-06-14 RX ORDER — HYDROXYZINE HYDROCHLORIDE 25 MG/1
25 TABLET, FILM COATED ORAL EVERY 4 HOURS PRN
Qty: 30 TABLET | Refills: 0 | Status: ON HOLD | OUTPATIENT
Start: 2021-06-14 | End: 2021-07-20

## 2021-06-14 RX ADMIN — LEVONORGESTREL AND ETHINYL ESTRADIOL 1 TABLET: KIT at 08:07

## 2021-06-14 RX ADMIN — FLUOXETINE 10 MG: 10 CAPSULE ORAL at 08:06

## 2021-06-14 RX ADMIN — HYDROXYZINE HYDROCHLORIDE 25 MG: 25 TABLET, FILM COATED ORAL at 10:14

## 2021-06-14 RX ADMIN — MELATONIN TAB 3 MG 6 MG: 3 TAB at 21:37

## 2021-06-14 RX ADMIN — RAMELTEON 8 MG: 8 TABLET ORAL at 21:37

## 2021-06-14 ASSESSMENT — ACTIVITIES OF DAILY LIVING (ADL)
DRESS: INDEPENDENT;SCRUBS (BEHAVIORAL HEALTH)
LAUNDRY: WITH SUPERVISION
HYGIENE/GROOMING: INDEPENDENT
ORAL_HYGIENE: INDEPENDENT

## 2021-06-14 NOTE — PLAN OF CARE
Pt is pleasant, cooperative and med compliant.  Pt has a full-range affect. Pt rates anxiety 5/10 and depression 6/10.  Pt denies any SI but admits to thoughts of wanting to run into a wall and give herself a concussion.  Pt contracts for safety but states concerns of those thoughts continuing if discharged from the hospital.  Pt feels safer out on the milieu and doesn't feel like she can trust herself alone in her room.  Pt states she doesn't have any coping mechanisms or any hobbies.  Writer provided pt with a journal and pt agreed to write down her thoughts to see if this helps her cope.  Pt is social on unit.  Pt requests prn hydroxyzine for anxiety rated 10/10.  Pt remains on the unit and avoids her room to avoid self-harm.  Pt attends groups. Pt starting journaling and states it is helping her.  Pt expresses group being interesting for her because they discussed coping mechanisms and she found it helpful.

## 2021-06-14 NOTE — PLAN OF CARE
Assessment/Intervention/Current Symptoms and Care Coordination: Met with team. Reviewed patient's chart and progress notes. Writer called [facility] at  [phone]. Writer scheduled a psychiatry appt for 6/16/2021 1:00 PM. Writer scheduled [facility] intake appointment for 6/16/2021 at 3:00 PM.           Discharge Plan or Goal: Will return home. Will follow up with outpatient psychiatrist at [facility]. Will follow up with [facility] Care Coordinator.            Barriers to Discharge: Needs clinical stabilization and medication management.                  Referral Status: Was referred to the [facility].            Legal Status: Voluntary

## 2021-06-14 NOTE — PLAN OF CARE
Problem: OT General Care Plan  Goal: OT Goal 1  Description: With min assist, Pt will demonstrate illness management skills necessary for positive resumption of home and community roles by discharge.    Date of Service: June 14, 2021    Description: Jacklyn attended 3 occupational therapy groups today.     10:15 - 11:00. Pt attended <30 mins in a chair yoga practice - No Charge.     11:15 - 12:00. 7 total participants. Occupational Therapy Clinic. Purpose: Coping skill exploration, creative expression within personally meaningful activities, and clinical observation of social, cognitive, and kinesthetic performance skills. Pt Response: Chosen activity: Detailed canvas painting. Demonstrated consistent performance and social skills as observed on previous dates as she was fully engaged and bright / social with peers.   1:15 - 2:00. 6 total participants. Mental health management group focusing on coping skill exploration. Education provided on maladaptive versus adaptive response to stress /symptoms and use within routine. Pt Response: Identified several coping skills utilized to reduce stress and manage symptoms: take a bath, music, carmencita, etc. Elaborated on application of skills to personal routine. Accepted additional suggestions within conversation.     Assessment: Demonstrates benefit from engagement in OT groups that support healthy recovery, specifically exploration of positive coping skills for symptom management, relapse prevention, and resumption of personal roles, routines and daily occupations.    Plan: Continue graded occupation-based activities for increased success towards goal and ongoing assessment. Initial assessment held d/t expected discharge tomorrow.     Aneta Figueroa OT on 6/14/2021 at 1:08 PM      Outcome: Improving

## 2021-06-14 NOTE — PROGRESS NOTES
" 06/13/21 2200   Groups   Details   (Psychotherapy group)   Number of patients attending the group:  7  Group Length:  1 Hours  Group Therapy Type: Psychotherapy    Summary of Group / Topics Discussed:      The  Psychotherapy group goal is to promote insight to positive choice and change. Group processing is within a supportive and safe environment. Patients will process emotions using verbal group and expressive psychotherapy interventions including visual art/writing interventions.    Group interventions support patients by: self compassion and hope/optimism    Modalities to reach these goals include: positive/solution focused psychology     Subjective -OK , enjoyed cribbage with \" best friend and peer\", also felt distressed after visit with boyfriend    Objective/ Intervention- Goal of group and Therapeutic modality utilized- Self compassion- letter of encouragement to self    Group Response- engaged    Patient Response-Pt was pleasant, cooperative and  engaged. She wrote a  thoughtful letter of encouragement to herself and shared  it with the group,  She wrote about the importance of meditation and spirituality to her.She was receptive to redirection when her and a peer were having a side conversation while others were speaking.    When she spoke of her best friend and boyfriend writer commented \" you had two visitors today \", she said \" no just one, my best friend is a patient here\", referring to male pt J.   It bears watching  for poor boundaries with this peer. The peer was not in group as he was sleeping but staff also mentioned boundary issues.    After group she asked if she would speak privately with writer as writer told her writer knew about the chakras she was speaking about in her writing. She spoke to writer and took notes about the meaning and symbolism of chakras which she is interested in.  She said she learned about chakras from the [employer] movie. Writer and pt spent about 15 minutes talking after " group. She was pleasant and engaged in the conversation.    Waylon Loredo, SHERRY, ATR-BC
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
## 2021-06-14 NOTE — PLAN OF CARE
Jacklyn appeared to sleep a total of 6.25 hours this night shift.  No prns or snacks given or requested.

## 2021-06-15 VITALS
RESPIRATION RATE: 16 BRPM | WEIGHT: 174 LBS | HEIGHT: 69 IN | DIASTOLIC BLOOD PRESSURE: 77 MMHG | HEART RATE: 80 BPM | OXYGEN SATURATION: 98 % | SYSTOLIC BLOOD PRESSURE: 131 MMHG | TEMPERATURE: 98.5 F | BODY MASS INDEX: 25.77 KG/M2

## 2021-06-15 PROCEDURE — 250N000013 HC RX MED GY IP 250 OP 250 PS 637: Performed by: PSYCHIATRY & NEUROLOGY

## 2021-06-15 PROCEDURE — 250N000013 HC RX MED GY IP 250 OP 250 PS 637: Performed by: STUDENT IN AN ORGANIZED HEALTH CARE EDUCATION/TRAINING PROGRAM

## 2021-06-15 PROCEDURE — G0177 OPPS/PHP; TRAIN & EDUC SERV: HCPCS

## 2021-06-15 PROCEDURE — 93005 ELECTROCARDIOGRAM TRACING: CPT

## 2021-06-15 PROCEDURE — 99239 HOSP IP/OBS DSCHRG MGMT >30: CPT | Mod: GC | Performed by: PSYCHIATRY & NEUROLOGY

## 2021-06-15 PROCEDURE — 93010 ELECTROCARDIOGRAM REPORT: CPT | Performed by: INTERNAL MEDICINE

## 2021-06-15 RX ADMIN — FLUOXETINE 10 MG: 10 CAPSULE ORAL at 08:04

## 2021-06-15 RX ADMIN — LEVONORGESTREL AND ETHINYL ESTRADIOL 1 TABLET: KIT at 08:04

## 2021-06-15 RX ADMIN — HYDROXYZINE HYDROCHLORIDE 25 MG: 25 TABLET, FILM COATED ORAL at 09:21

## 2021-06-15 ASSESSMENT — MIFFLIN-ST. JEOR: SCORE: 1623.64
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
## 2021-06-15 NOTE — PLAN OF CARE
06/15/21 1300   Occupational Therapy   Type of Intervention structured groups   Response Initiates, socially acceptable   Hours 1   Treatment Detail see note     Pt. participated in OT clinic IND, where she initiated a chosen project (painted the tree of life on a canvas), followed through with plan, and asked for support with supplies as needed. Selected several songs online to share with the group. Pt. reported looking forward to discharge this afternoon.

## 2021-06-15 NOTE — PLAN OF CARE
Problem: Adult Inpatient Plan of Care  Goal: Plan of Care Review  Outcome: No Change  Flowsheets (Taken 6/14/2021 2100)  Plan of Care Reviewed With: patient    Pt has had a good shift. Had a visit from her bf that went well. Pt denied SI/SIB or hallucinations. Spent the majority of the shift in the milieu socializing w/peers and watching TV. Plan is for pt to discharge tomorrow, meds are here.

## 2021-06-15 NOTE — PLAN OF CARE
Pt will discharge to home at about 2:00 PM as per orders, to be picked up by boyfriend.     Discharge paperwork discussed with pt and she did not have questions at the time. Medications and belongings given to her  without an issue. All signed out for.     Pt presents with a full range affect, pleasant and cooperative with staff. Pt denies SI/SIB/Voices, depression or anxiety. Pt is alert and oriented, has no guns available to her and stated 3 coping mechanisms: - running, taking a bath and painting. Pt's  support system are her mom and boyfriend.

## 2021-06-15 NOTE — PLAN OF CARE
Assessment/Intervention/Current Symptoms and Care Coordination: Met with team. Reviewed patient's chart and progress notes. Writer checked in with patient to discuss aftercare plan. Writer completed AVS.          Discharge Plan or Goal: Will return home. Will follow up with outpatient psychiatrist at [facility]. Will follow up with [facility] Care Coordinator.            Barriers to Discharge: Patient denies any SI/SIB/HI. Patient is ready to discharge.                  Referral Status: Was referred to the [facility].            Legal Status: Voluntary

## 2021-06-16 ENCOUNTER — HOSPITAL ENCOUNTER (OUTPATIENT)
Dept: BEHAVIORAL HEALTH | Facility: CLINIC | Age: 21
End: 2021-06-16
Attending: FAMILY MEDICINE
Payer: COMMERCIAL

## 2021-06-16 LAB — INTERPRETATION ECG - MUSE: NORMAL

## 2021-06-16 PROCEDURE — 999N000216 HC STATISTIC ADULT CD FACE TO FACE-NO CHRG: Mod: GT | Performed by: COUNSELOR

## 2021-06-16 ASSESSMENT — ANXIETY QUESTIONNAIRES
5. BEING SO RESTLESS THAT IT IS HARD TO SIT STILL: SEVERAL DAYS
GAD7 TOTAL SCORE: 16
7. FEELING AFRAID AS IF SOMETHING AWFUL MIGHT HAPPEN: MORE THAN HALF THE DAYS
GAD7 TOTAL SCORE: 16
3. WORRYING TOO MUCH ABOUT DIFFERENT THINGS: NEARLY EVERY DAY
6. BECOMING EASILY ANNOYED OR IRRITABLE: MORE THAN HALF THE DAYS
4. TROUBLE RELAXING: MORE THAN HALF THE DAYS
2. NOT BEING ABLE TO STOP OR CONTROL WORRYING: NEARLY EVERY DAY
GAD7 TOTAL SCORE: 16
7. FEELING AFRAID AS IF SOMETHING AWFUL MIGHT HAPPEN: MORE THAN HALF THE DAYS
1. FEELING NERVOUS, ANXIOUS, OR ON EDGE: NEARLY EVERY DAY

## 2021-06-16 ASSESSMENT — PATIENT HEALTH QUESTIONNAIRE - PHQ9
SUM OF ALL RESPONSES TO PHQ QUESTIONS 1-9: 9
10. IF YOU CHECKED OFF ANY PROBLEMS, HOW DIFFICULT HAVE THESE PROBLEMS MADE IT FOR YOU TO DO YOUR WORK, TAKE CARE OF THINGS AT HOME, OR GET ALONG WITH OTHER PEOPLE: NOT DIFFICULT AT ALL
SUM OF ALL RESPONSES TO PHQ QUESTIONS 1-9: 9

## 2021-06-16 NOTE — DISCHARGE INSTRUCTIONS
Behavioral Discharge Planning and Instructions    Summary: You were admitted on 6/9/2021  due to worsening depressive symptoms .  You were treated by Dr. Dunham and discharged on 6/15/2021 from [facility] to Home    Main Diagnosis: Unspecified anxiety and depression     Health Care Follow-up:   Appointment Date/Time: 6/16/2021 1:00 PM. ***This will be a TeleHealth appointment***  [facility]  Psychiatrist: PUNEET Bates     Address: [address] [place] [address]   Phone Number: [phone]  Fax: [phone]        Date/Time: 6/16/2021 at 3:00 PM. ***This will be a video appointment. You will need to active it your [employer] account to access your appointment link   [place] Partial Hospitalization Program (PHP) and/or Intensive Outpatient Program (IOP) contact number is: [phone].   [employer] IT support: [phone]      Other Providers:    [facility] Care Coordinator: Zoë Jeronimo [phone]          Attend all scheduled appointments with your outpatient providers. Call at least 24 hours in advance if you need to reschedule an appointment to ensure continued access to your outpatient providers.     Major Treatments, Procedures and Findings:  You were provided with: a psychiatric assessment, assessed for medical stability, medication evaluation and/or management, group therapy and milieu management    Symptoms to Report: feeling more aggressive, increased confusion, losing more sleep, mood getting worse or thoughts of suicide    Early warning signs can include: increased depression or anxiety sleep disturbances increased thoughts or behaviors of suicide or self-harm  increased unusual thinking, such as paranoia or hearing voices    Safety and Wellness:  Take all medicines as directed.  Make no changes unless your doctor suggests them. Follow treatment recommendations.  Refrain from alcohol and non-prescribed drugs.  Ask your support system to help you reduce your access  "to items that could harm yourself or others. If there is a concern for safety, call 911.    Resources:   Crisis Intervention: 485.192.1612 or 763-248-0058 (TTY: 148.424.5188).  Call anytime for help.  [employer] [place] on Mental Illness (www.mn.swapna.org): 290.765.1521 or 096-228-0095.  Suicide Awareness Voices of Education (SAVE) (www.save.org): 337-910-UBKK (1903)  National Suicide Prevention Line (www.mentalhealthmn.org): 768-885-CYJV (7490)  Mental Health Consumer/Survivor Network of [place] (www.mhcsn.net): 874.966.2662 or 859-771-8067  Mental Health Association of [place] (www.mentalhealth.org): 805.108.4776 or 573-662-9569  Self- Management and Recovery Training., [employer]-- Toll free: 919.351.7156  www.[employer].Zapproved  [facility] Crisis (COPE) Response - Adult 074 747-4710  [facility] Crisis Response - Adult 749 052-7357  Text 4 Life: txt \"LIFE\" to 27422 for immediate support and crisis intervention  Crisis text line: Text \"[place]\" to 006966. Free, confidential, 24/7.  Crisis Intervention: 938.288.8170 or 027-548-5544. Call anytime for help.     General Medication Instructions:   See your medication sheet(s) for instructions.   Take all medicines as directed.  Make no changes unless your doctor suggests them.   Go to all your doctor visits.  Be sure to have all your required lab tests. This way, your medicines can be refilled on time.  Do not use any drugs not prescribed by your doctor.  Avoid alcohol.    Advance Directives:   Scanned document on file with [place]? No scanned doc  Is document scanned? Pt states no documents  Honoring Choices Your Rights Handout: Informed and given  Was more information offered? Materials given    The Treatment team has appreciated the opportunity to work with you. If you have any questions or concerns about your recent admission, you can contact the unit which can receive your call 24 hours a day, 7 days a week. They will be able to get in touch with a Provider if needed. The unit " number is [phone].

## 2021-06-16 NOTE — PROGRESS NOTES
[facility]    ADULT Mental Health Assessment Appointment Note    PATIENT'S NAME:  Jacklyn Ho    MRN: [id number]  YOB: 2000  Address:  [address] [place] [address]  PREFERRED PHONE: [phone]   [email or web address]    DATE OF SERVICE: 6/16/21  START TIME: 3:00pm  END TIME: 3:17pm  SERVICE MODALITY:  Video Visit:      Provider verified identity through the following two step process.  Patient provided:  Patient was verified at admission/transfer    Telemedicine Visit: The patient's condition can be safely assessed and treated via synchronous audio and visual telemedicine encounter.      Reason for Telemedicine Visit: Services only offered telehealth    Originating Site (Patient Location): Patient's home    Distant Site (Provider Location): Provider Remote Setting- Home Office    Consent:  The patient/guardian has verbally consented to: the potential risks and benefits of telemedicine (video visit) versus in person care; bill my insurance or make self-payment for services provided; and responsibility for payment of non-covered services.     Patient would like the video invitation sent by:  My Chart    Mode of Communication:  Video Conference via [employer]    As the provider I attest to compliance with applicable laws and regulations related to telemedicine.    Identifying Information:  Patient is a 20 year old.  Patient was referred for an assessment by [facility].  Patient attended the session alone. Patient identified their preferred language to be English. Patient reported they does not need the assistance of an  or other support involved in therapy.     Services Requested:   Patient was scheduled for an outpatient Diagnostic Assessment [facility]. Since April, she has been working with psychiatrist Shadia Hamlin at [facility]. Patient stated she  meets with her psychiatrist once per week, and will start therapy once per week with the psychiatrist as well. Patient also has  at [facility], who has resources for programs throughout the whole network. Patient does not have legal concerns.     Patient lives with three roommates in campus apartments. They report that housing is stable. She reported she feels safe where she lives. Patient reported having no child(issac). Patient identified partner;mother as part of their support system.  Patient identified the quality of these relationships as fair.     Patient is currently employed part time working for [employer] and [employer] for the next two weeks. She works Sunday, Tuesday, and Wednesdays through the month of June. In July, she has another job lined up that will be from 9 to 5pm.  Patient reports their finances are obtained through employment. Patient does not identify finances as a current stressor.      Mental Health:  Patient reported the following previous diagnoses which include(s): an anxiety disorder;depression .  Patient reported symptoms began 09/16/14.  Patient has received mental health services in the past:  therapy.  Psychiatric Hospitalizations: [facility] when  Dee [age].     Review of Symptoms per patient report:  Depression: Change in sleep, Lack of interest, Change in energy level, Change in appetite and Feeling sad, down, or depressed  Anxiety: Excessive worry, Nervousness, Sleep disturbance, Psychomotor agitation, Ruminations and Irritability    Current Outpatient Medications   Medication     FLUoxetine (PROZAC) 10 MG capsule     hydrOXYzine (ATARAX) 25 MG tablet     levonorgestrel-ethinyl estradiol (NORDETTE) 0.15-30 MG-MCG tablet     ramelteon (ROZEREM) 8 MG tablet     Medication Adherence:  Patient reports taking prescribed medications as prescribed. The Prozac is new and Hydroxyzine helps with anxiety.     Substance Use:    Substance History of  use Age of first use Date of last use     Pattern and duration of use (include amounts and frequency)   Alcohol used in the past 18 06/08/21    Cannabis currently use 14 06/15/21    Amphetamines used in the past   05/16/20    Cocaine/crack  used in the past 18  04/16/20     Hallucinogens used in the past   18  06/08/21     Inhalants never used            Heroin never used            Other Opiates never used        Benzodiazepine never used        Barbiturates never used        Over the counter meds never used        Caffeine never used        Nicotine  never used        Other substances  never used          Patient reported the following problems as a result of their substance use: no problems, not applicable.     CAGE-AID Total Score 6/16/2021   Total Score MyChart 0 (A total score of 2 or greater is considered clinically significant)     Current Mental Status Exam:   Appearance:  Appropriate    Eye Contact:  Good   Psychomotor:  Normal       Gait / station:  no problem  Attitude / Demeanor: Cooperative  Friendly Pleasant  Speech      Rate / Production: Normal/ Responsive      Volume:  Normal  volume      Language:  intact  Mood:   Normal  Affect:   Appropriate    Thought Content: Clear   Thought Process: Coherent       Associations: No loosening of associations  Insight:   Fair   Judgment:  Intact   Orientation:  All  Attention/concentration: Good    Rating Scales:      PHQ-9 SCORE 6/16/2021   PHQ-9 Total Score MyChart 9 (Mild depression)   PHQ-9 Total Score 9     CRISTOBAL-7 SCORE 6/16/2021   Total Score 16 (severe anxiety)   Total Score 16       Safety Assessment:   Current Safety Concerns: Patient stated she is feeling okay. The medications really work. She denied suicidal ideation, plan, and intent.    [place] Suicide Severity Rating (Short Version) 6/9/2021 6/9/2021 6/16/2021   Over the past 2 weeks have you felt down, depressed, or hopeless? yes - yes   Over the past 2 weeks have you had thoughts of killing  yourself? yes - yes   Have you ever attempted to kill yourself? yes - no   When did this last happen? more than 6 months ago - -   Q1 Wished to be Dead (Past Month) no no -   Q2 Suicidal Thoughts (Past Month) yes yes -   Q3 Suicidal Thought Method no no -   Q4 Suicidal Intent without Specific Plan no no -   Q5 Suicide Intent with Specific Plan no no -   Q6 Suicide Behavior (Lifetime) yes yes -   High Risk Required Interventions On continuous in person observation - -   Required Interventions Provider notified - -     Patient denies current homicidal ideation and behaviors.  Patient denies current self-injurious ideation and behaviors.    Patient denied risk behaviors associated with substance use.  Patient denies any high risk behaviors associated with mental health symptoms.  Patient reports the following current concerns for their personal safety: None.  Patient reports there are not firearms in the house.       Patient reports the following protective factors: dedication to family or friends;regular physical activity;sense of belonging;living with other people;strong sense of self worth or esteem    Clinical Impressions:  A) Recurrent episode(s) - symptoms have been present during the same 2-week period and represent a change from previous functioning 5 or more symptoms (required for diagnosis)   - Depressed mood. Note: In children and adolescents, can be irritable mood.     - Diminished interest or pleasure in all, or almost all, activities.    - Increased sleep.    - Fatigue or loss of energy.    - Recurrent thoughts of death (not just fear of dying), recurrent suicidal ideation without a specific plan, or a suicide attempt or a specific plan for committing suicide.     Therapeutic Interventions Provided: supportive active listening, ensured safety, explored alternatives and encouraged social supports    Recommendations/Plan: Patient was scheduled for this assessment while hospitalized.  explained the  referral was Partial Hospitalization and explained [facility] and Adult Day Treatment programs. Patient stated she told the hospital caseworkers that she is working. They had told her about the groups but not the times or the length of the programs. Patient stated she can't do groups due to her work schedule. She will continue to work with her psychiatrist weekly and increase to twice weekly for therapy. Patient will contact and utilize her [facility]  for resources in the community.        CAROLYN Bazzi,  June 16, 2021  [facility]

## 2021-06-17 ASSESSMENT — ANXIETY QUESTIONNAIRES: GAD7 TOTAL SCORE: 16

## 2021-06-17 ASSESSMENT — PATIENT HEALTH QUESTIONNAIRE - PHQ9: SUM OF ALL RESPONSES TO PHQ QUESTIONS 1-9: 9

## 2021-06-26 ENCOUNTER — HEALTH MAINTENANCE LETTER (OUTPATIENT)
Age: 21
End: 2021-06-26

## 2021-07-13 ENCOUNTER — TELEPHONE (OUTPATIENT)
Dept: BEHAVIORAL HEALTH | Facility: CLINIC | Age: 21
End: 2021-07-13

## 2021-07-13 ENCOUNTER — HOSPITAL ENCOUNTER (INPATIENT)
Facility: CLINIC | Age: 21
LOS: 7 days | Discharge: HOME | DRG: 885 | End: 2021-07-20
Attending: EMERGENCY MEDICINE | Admitting: PSYCHIATRY & NEUROLOGY
Payer: COMMERCIAL

## 2021-07-13 DIAGNOSIS — R45.851 SUICIDAL IDEATION: ICD-10-CM

## 2021-07-13 DIAGNOSIS — F32.9 CHRONIC MAJOR DEPRESSIVE DISORDER, SINGLE EPISODE: ICD-10-CM

## 2021-07-13 DIAGNOSIS — Z11.52 ENCOUNTER FOR SCREENING LABORATORY TESTING FOR COVID-19 VIRUS: ICD-10-CM

## 2021-07-13 DIAGNOSIS — F41.9 ANXIETY: ICD-10-CM

## 2021-07-13 DIAGNOSIS — F43.10 PTSD (POST-TRAUMATIC STRESS DISORDER): ICD-10-CM

## 2021-07-13 DIAGNOSIS — K59.00 CONSTIPATION, UNSPECIFIED CONSTIPATION TYPE: Primary | ICD-10-CM

## 2021-07-13 LAB
ALBUMIN SERPL-MCNC: 4.1 G/DL (ref 3.4–5)
ALP SERPL-CCNC: 69 U/L (ref 40–150)
ALT SERPL W P-5'-P-CCNC: 47 U/L (ref 0–50)
AMPHETAMINES UR QL SCN: ABNORMAL
ANION GAP SERPL CALCULATED.3IONS-SCNC: 5 MMOL/L (ref 3–14)
APAP SERPL-MCNC: <2 MG/L (ref 10–30)
AST SERPL W P-5'-P-CCNC: 24 U/L (ref 0–45)
ATRIAL RATE - MUSE: 55 BPM
BARBITURATES UR QL: ABNORMAL
BASOPHILS # BLD AUTO: 0 10E3/UL (ref 0–0.2)
BASOPHILS NFR BLD AUTO: 0 %
BENZODIAZ UR QL: ABNORMAL
BILIRUB SERPL-MCNC: 0.5 MG/DL (ref 0.2–1.3)
BUN SERPL-MCNC: 13 MG/DL (ref 7–30)
CALCIUM SERPL-MCNC: 9.4 MG/DL (ref 8.5–10.1)
CANNABINOIDS UR QL SCN: ABNORMAL
CHLORIDE BLD-SCNC: 110 MMOL/L (ref 94–109)
CO2 SERPL-SCNC: 24 MMOL/L (ref 20–32)
COCAINE UR QL: ABNORMAL
CREAT SERPL-MCNC: 1.04 MG/DL (ref 0.52–1.04)
DIASTOLIC BLOOD PRESSURE - MUSE: NORMAL MMHG
EOSINOPHIL # BLD AUTO: 0 10E3/UL (ref 0–0.7)
EOSINOPHIL NFR BLD AUTO: 0 %
ERYTHROCYTE [DISTWIDTH] IN BLOOD BY AUTOMATED COUNT: 12.1 % (ref 10–15)
GFR SERPL CREATININE-BSD FRML MDRD: 78 ML/MIN/1.73M2
GLUCOSE BLD-MCNC: 82 MG/DL (ref 70–99)
HCG UR QL: NEGATIVE
HCT VFR BLD AUTO: 39.5 % (ref 35–47)
HGB BLD-MCNC: 13.1 G/DL (ref 11.7–15.7)
HOLD SPECIMEN: NORMAL
HOLD SPECIMEN: NORMAL
IMM GRANULOCYTES # BLD: 0 10E3/UL
IMM GRANULOCYTES NFR BLD: 0 %
INTERPRETATION ECG - MUSE: NORMAL
LYMPHOCYTES # BLD AUTO: 2.1 10E3/UL (ref 0.8–5.3)
LYMPHOCYTES NFR BLD AUTO: 28 %
MCH RBC QN AUTO: 30.5 PG (ref 26.5–33)
MCHC RBC AUTO-ENTMCNC: 33.2 G/DL (ref 31.5–36.5)
MCV RBC AUTO: 92 FL (ref 78–100)
MONOCYTES # BLD AUTO: 0.6 10E3/UL (ref 0–1.3)
MONOCYTES NFR BLD AUTO: 8 %
NEUTROPHILS # BLD AUTO: 4.5 10E3/UL (ref 1.6–8.3)
NEUTROPHILS NFR BLD AUTO: 64 %
NRBC # BLD AUTO: 0 10E3/UL
NRBC BLD AUTO-RTO: 0 /100
OPIATES UR QL SCN: ABNORMAL
P AXIS - MUSE: 61 DEGREES
PLATELET # BLD AUTO: 262 10E3/UL (ref 150–450)
POTASSIUM BLD-SCNC: 4.2 MMOL/L (ref 3.4–5.3)
PR INTERVAL - MUSE: 150 MS
PROT SERPL-MCNC: 7.4 G/DL (ref 6.8–8.8)
QRS DURATION - MUSE: 80 MS
QT - MUSE: 394 MS
QTC - MUSE: 376 MS
R AXIS - MUSE: 46 DEGREES
RBC # BLD AUTO: 4.29 10E6/UL (ref 3.8–5.2)
SALICYLATES SERPL-MCNC: <2 MG/DL
SARS-COV-2 RNA RESP QL NAA+PROBE: NEGATIVE
SODIUM SERPL-SCNC: 139 MMOL/L (ref 133–144)
SYSTOLIC BLOOD PRESSURE - MUSE: NORMAL MMHG
T AXIS - MUSE: 50 DEGREES
VENTRICULAR RATE- MUSE: 55 BPM
WBC # BLD AUTO: 7.2 10E3/UL (ref 4–11)

## 2021-07-13 PROCEDURE — 36592 COLLECT BLOOD FROM PICC: CPT | Performed by: EMERGENCY MEDICINE

## 2021-07-13 PROCEDURE — 80179 DRUG ASSAY SALICYLATE: CPT | Performed by: EMERGENCY MEDICINE

## 2021-07-13 PROCEDURE — 82040 ASSAY OF SERUM ALBUMIN: CPT | Performed by: EMERGENCY MEDICINE

## 2021-07-13 PROCEDURE — 80143 DRUG ASSAY ACETAMINOPHEN: CPT | Performed by: EMERGENCY MEDICINE

## 2021-07-13 PROCEDURE — 81025 URINE PREGNANCY TEST: CPT | Performed by: EMERGENCY MEDICINE

## 2021-07-13 PROCEDURE — 90791 PSYCH DIAGNOSTIC EVALUATION: CPT

## 2021-07-13 PROCEDURE — 99285 EMERGENCY DEPT VISIT HI MDM: CPT | Performed by: EMERGENCY MEDICINE

## 2021-07-13 PROCEDURE — 124N000002 HC R&B MH UMMC

## 2021-07-13 PROCEDURE — 80307 DRUG TEST PRSMV CHEM ANLYZR: CPT | Performed by: EMERGENCY MEDICINE

## 2021-07-13 PROCEDURE — 85025 COMPLETE CBC W/AUTO DIFF WBC: CPT | Performed by: EMERGENCY MEDICINE

## 2021-07-13 PROCEDURE — 250N000013 HC RX MED GY IP 250 OP 250 PS 637: Performed by: FAMILY MEDICINE

## 2021-07-13 PROCEDURE — U0003 INFECTIOUS AGENT DETECTION BY NUCLEIC ACID (DNA OR RNA); SEVERE ACUTE RESPIRATORY SYNDROME CORONAVIRUS 2 (SARS-COV-2) (CORONAVIRUS DISEASE [COVID-19]), AMPLIFIED PROBE TECHNIQUE, MAKING USE OF HIGH THROUGHPUT TECHNOLOGIES AS DESCRIBED BY CMS-2020-01-R: HCPCS | Performed by: PSYCHIATRY & NEUROLOGY

## 2021-07-13 PROCEDURE — C9803 HOPD COVID-19 SPEC COLLECT: HCPCS | Performed by: EMERGENCY MEDICINE

## 2021-07-13 PROCEDURE — 93005 ELECTROCARDIOGRAM TRACING: CPT | Performed by: EMERGENCY MEDICINE

## 2021-07-13 PROCEDURE — 99285 EMERGENCY DEPT VISIT HI MDM: CPT | Mod: 25 | Performed by: EMERGENCY MEDICINE

## 2021-07-13 RX ORDER — MEDROXYPROGESTERONE ACETATE 150 MG/ML
150 INJECTION, SUSPENSION INTRAMUSCULAR
COMMUNITY

## 2021-07-13 RX ORDER — ACETAMINOPHEN 325 MG/1
650 TABLET ORAL EVERY 4 HOURS PRN
Status: DISCONTINUED | OUTPATIENT
Start: 2021-07-13 | End: 2021-07-20 | Stop reason: HOSPADM

## 2021-07-13 RX ORDER — AMOXICILLIN 250 MG
1 CAPSULE ORAL 2 TIMES DAILY PRN
Status: DISCONTINUED | OUTPATIENT
Start: 2021-07-13 | End: 2021-07-18

## 2021-07-13 RX ORDER — OLANZAPINE 10 MG/2ML
10 INJECTION, POWDER, FOR SOLUTION INTRAMUSCULAR 3 TIMES DAILY PRN
Status: DISCONTINUED | OUTPATIENT
Start: 2021-07-13 | End: 2021-07-14

## 2021-07-13 RX ORDER — OLANZAPINE 10 MG/1
10 TABLET ORAL 3 TIMES DAILY PRN
Status: DISCONTINUED | OUTPATIENT
Start: 2021-07-13 | End: 2021-07-14

## 2021-07-13 RX ORDER — MAGNESIUM HYDROXIDE/ALUMINUM HYDROXICE/SIMETHICONE 120; 1200; 1200 MG/30ML; MG/30ML; MG/30ML
30 SUSPENSION ORAL EVERY 4 HOURS PRN
Status: DISCONTINUED | OUTPATIENT
Start: 2021-07-13 | End: 2021-07-20 | Stop reason: HOSPADM

## 2021-07-13 RX ORDER — HYDROXYZINE HYDROCHLORIDE 50 MG/1
50 TABLET, FILM COATED ORAL EVERY 4 HOURS PRN
Status: DISCONTINUED | OUTPATIENT
Start: 2021-07-13 | End: 2021-07-20 | Stop reason: HOSPADM

## 2021-07-13 RX ORDER — HYDROXYZINE HYDROCHLORIDE 25 MG/1
50 TABLET, FILM COATED ORAL ONCE
Status: COMPLETED | OUTPATIENT
Start: 2021-07-13 | End: 2021-07-13

## 2021-07-13 RX ORDER — TRAZODONE HYDROCHLORIDE 50 MG/1
50 TABLET, FILM COATED ORAL
Status: DISCONTINUED | OUTPATIENT
Start: 2021-07-13 | End: 2021-07-20 | Stop reason: HOSPADM

## 2021-07-13 RX ADMIN — HYDROXYZINE HYDROCHLORIDE 50 MG: 25 TABLET, FILM COATED ORAL at 20:33

## 2021-07-13 ASSESSMENT — ENCOUNTER SYMPTOMS
RESPIRATORY NEGATIVE: 1
BACK PAIN: 0
SORE THROAT: 0
SHORTNESS OF BREATH: 0
NAUSEA: 0
NEUROLOGICAL NEGATIVE: 1
VOMITING: 0
HYPERACTIVE: 0
CARDIOVASCULAR NEGATIVE: 1
MUSCULOSKELETAL NEGATIVE: 1
EYES NEGATIVE: 1
HALLUCINATIONS: 0
COUGH: 0
EYE REDNESS: 0
CONSTITUTIONAL NEGATIVE: 1
DIFFICULTY URINATING: 0
FEVER: 0
NECK PAIN: 0
HEADACHES: 0
GASTROINTESTINAL NEGATIVE: 1
DYSPHORIC MOOD: 1
SLEEP DISTURBANCE: 0
ABDOMINAL PAIN: 0
DYSURIA: 0

## 2021-07-13 ASSESSMENT — MIFFLIN-ST. JEOR: SCORE: 1621.99

## 2021-07-13 NOTE — ED NOTES
Bed: HW02  Expected date: 7/13/21  Expected time: 9:40 AM  Means of arrival:   Comments:  Stp [age]yof SI off meds

## 2021-07-13 NOTE — ED NOTES
Introduced myself to Jacklyn. Informed her that her dad called. I asked for and was granted permission to speak with dad about her stay here. Pt has 1:1, Manny, in the room. Pt has cell phone and is communicating with family members by text.
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
## 2021-07-13 NOTE — ED NOTES
Patient arrives to [facility]. Psych Associate explains process and gives patient urine cup. Patient told about meeting with Mental Health  and Psychiatrist. Patient told about 2-5 hour time frame for complete evaluation.

## 2021-07-13 NOTE — ED TRIAGE NOTES
Patient her for SI with SIb, patient cutting on her thigh 1 day ago and attempted to overdose on her Sleeping PIlls 2 days ago , took 10 tabs of Rozerem , patient upset it didn't work, woke up today and felt more suicidal and wanting to harm self again, called 911 instead. Patient stopped taking her psych meds a month ago.

## 2021-07-13 NOTE — ED PROVIDER NOTES
ED Provider Note  [facility]      History     Chief Complaint   Patient presents with     Suicidal     SI with SIB     HPI  Jacklyn Ho is a 20 year old female who is here via EMS from home after she told her psychiatric provider that she has been feeling suicidal and had been cutting and had ingested 10 sleeping pills 2 days ago. Patient was seen in the main ED and medical cleared. She has history of being hospitalized here 1 month ago for suicidal thoughts. She is a college student. Patient felt that the medications prescribed (fluoxetine and hydroxyzine) were not helpful on discharge and that she was feeling dulled and blunted and decided to flush them down the toilet. Patient deferred going to recommended PHP as it interfered with her work. She also cited financial stressors that were barriers to getting additional help and support.    Patient also reports relationship strain and that her boyfriend has not been supportive. She felt hopeless, worthless and powerless in her life. She admits to persistent intrusive SI, and would like to be admitted.    Patient denies acute medical concerns. She denies COVID symptoms and denies substance abuse other than THC use.    Please see [facility] Crisis Assessment on 7/13/21 in Epic for further details.    PERSONAL MEDICAL HISTORY  History reviewed. No pertinent past medical history.  PAST SURGICAL HISTORY  History reviewed. No pertinent surgical history.  FAMILY HISTORY  No family history on file.  SOCIAL HISTORY  Social History     Tobacco Use     Smoking status: Never Smoker     Smokeless tobacco: Never Used   Substance Use Topics     Alcohol use: Not Currently     MEDICATIONS  No current facility-administered medications for this encounter.     Current Outpatient Medications   Medication     FLUoxetine (PROZAC) 10 MG capsule     hydrOXYzine (ATARAX) 25 MG tablet     levonorgestrel-ethinyl estradiol (NORDETTE) 0.15-30 MG-MCG tablet      ramelteon (ROZEREM) 8 MG tablet     ALLERGIES  No Known Allergies       Review of Systems   Constitutional: Negative.    HENT: Negative.    Eyes: Negative.    Respiratory: Negative.    Cardiovascular: Negative.    Gastrointestinal: Negative.    Genitourinary: Negative.    Musculoskeletal: Negative.    Skin: Negative.    Neurological: Negative.    Psychiatric/Behavioral: Positive for self-injury and suicidal ideas. Negative for hallucinations. The patient is not hyperactive.    All other systems reviewed and are negative.        Physical Exam   BP: 124/81  Pulse: 69  Temp: 98.3  F (36.8  C)  Resp: 16  SpO2: 97 %  Physical Exam  Vitals and nursing note reviewed.   HENT:      Head: Normocephalic.   Eyes:      Pupils: Pupils are equal, round, and reactive to light.   Pulmonary:      Effort: Pulmonary effort is normal.   Musculoskeletal:         General: Normal range of motion.      Cervical back: Normal range of motion.   Neurological:      General: No focal deficit present.      Mental Status: She is alert.   Psychiatric:         Attention and Perception: Attention and perception normal. She does not perceive auditory or visual hallucinations.         Mood and Affect: Affect normal. Mood is depressed.         Speech: Speech normal.         Behavior: Behavior normal. Behavior is cooperative.         Thought Content: Thought content is not paranoid or delusional. Thought content includes suicidal ideation. Thought content does not include homicidal ideation. Thought content includes suicidal plan.         Cognition and Memory: Cognition and memory normal.         Judgment: Judgment is impulsive.         ED Course      Procedures            Results for orders placed or performed during the hospital encounter of 07/13/21   Comprehensive metabolic panel     Status: Abnormal   Result Value Ref Range    Sodium 139 133 - 144 mmol/L    Potassium 4.2 3.4 - 5.3 mmol/L    Chloride 110 (H) 94 - 109 mmol/L    Carbon Dioxide (CO2) 24  20 - 32 mmol/L    Anion Gap 5 3 - 14 mmol/L    Urea Nitrogen 13 7 - 30 mg/dL    Creatinine 1.04 0.52 - 1.04 mg/dL    Calcium 9.4 8.5 - 10.1 mg/dL    Glucose 82 70 - 99 mg/dL    Alkaline Phosphatase 69 40 - 150 U/L    AST 24 0 - 45 U/L    ALT 47 0 - 50 U/L    Protein Total 7.4 6.8 - 8.8 g/dL    Albumin 4.1 3.4 - 5.0 g/dL    Bilirubin Total 0.5 0.2 - 1.3 mg/dL    GFR Estimate 78 >60 mL/min/1.73m2   Acetaminophen level     Status: Abnormal   Result Value Ref Range    Acetaminophen <2 (L) 10 - 30 mg/L   Salicylate level     Status: Normal   Result Value Ref Range    Salicylate <2 <20 mg/dL   HCG qualitative urine (UPT)     Status: Normal   Result Value Ref Range    hCG Urine Qualitative Negative Negative   CBC with platelets and differential     Status: None   Result Value Ref Range    WBC Count 7.2 4.0 - 11.0 10e3/uL    RBC Count 4.29 3.80 - 5.20 10e6/uL    Hemoglobin 13.1 11.7 - 15.7 g/dL    Hematocrit 39.5 35.0 - 47.0 %    MCV 92 78 - 100 fL    MCH 30.5 26.5 - 33.0 pg    MCHC 33.2 31.5 - 36.5 g/dL    RDW 12.1 10.0 - 15.0 %    Platelet Count 262 150 - 450 10e3/uL    % Neutrophils 64 %    % Lymphocytes 28 %    % Monocytes 8 %    % Eosinophils 0 %    % Basophils 0 %    % Immature Granulocytes 0 %    NRBCs per 100 WBC 0 <1 /100    Absolute Neutrophils 4.5 1.6 - 8.3 10e3/uL    Absolute Lymphocytes 2.1 0.8 - 5.3 10e3/uL    Absolute Monocytes 0.6 0.0 - 1.3 10e3/uL    Absolute Eosinophils 0.0 0.0 - 0.7 10e3/uL    Absolute Basophils 0.0 0.0 - 0.2 10e3/uL    Absolute Immature Granulocytes 0.0 <=0.0 10e3/uL    Absolute NRBCs 0.0 10e3/uL   Extra Blue Top Tube     Status: None   Result Value Ref Range    Hold Specimen [facility]    Extra Green Top (Lithium Heparin) Tube     Status: None   Result Value Ref Range    Hold Specimen [facility]    Drug abuse screen 1 urine (ED)     Status: Abnormal   Result Value Ref Range    Amphetamines Urine Screen Negative Screen Negative    Barbiturates Urine Screen Negative Screen Negative    Benzodiazepines  Urine Screen Negative Screen Negative    Cannabinoids Urine Screen Positive (A) Screen Negative    Cocaine Urine Screen Negative Screen Negative    Opiates Urine Screen Negative Screen Negative   EKG 12-lead, tracing only     Status: None   Result Value Ref Range    Systolic Blood Pressure  mmHg    Diastolic Blood Pressure  mmHg    Ventricular Rate 55 BPM    Atrial Rate 55 BPM    LA Interval 150 ms    QRS Duration 80 ms     ms    QTc 376 ms    P Axis 61 degrees    R AXIS 46 degrees    T Axis 50 degrees    Interpretation ECG Click View Image link to view waveform and result    CBC with platelets differential     Status: None    Narrative    The following orders were created for panel order CBC with platelets differential.  Procedure                               Abnormality         Status                     ---------                               -----------         ------                     CBC with platelets and d...[[id number]]                      Final result                 Please view results for these tests on the individual orders.   Urine Drugs of Abuse Screen     Status: Abnormal    Narrative    The following orders were created for panel order Urine Drugs of Abuse Screen.  Procedure                               Abnormality         Status                     ---------                               -----------         ------                     Drug abuse screen 1 urin...[[id number]]  Abnormal            Final result                 Please view results for these tests on the individual orders.   [facility] Draw     Status: None    Narrative    The following orders were created for panel order [facility] Draw.  Procedure                               Abnormality         Status                     ---------                               -----------         ------                     Extra Blue Top Tube[[id number]]                              Final result               Extra Green Top (Lithium...[[id number]]                       Final result                 Please view results for these tests on the individual orders.     Medications - No data to display     Assessments & Plan (with Medical Decision Making)   Patient with history of PTSD who is feeling acute suicidal due to feeling overwhelmed, hopeless, helpless and worthless. She has been engaging in suicidal gestures and continues to feel impulsive and unsafe. She is voluntary for admission.    I have reviewed the nursing notes. I have reviewed the findings, diagnosis, plan and need for follow up with the patient.    New Prescriptions    No medications on file       Final diagnoses:   PTSD (post-traumatic stress disorder)   Suicidal ideation       --  Johnny Henderson MD  [facility] EMERGENCY DEPARTMENT  7/13/2021     Johnny Henderson MD  07/13/21 [id number]

## 2021-07-13 NOTE — TELEPHONE ENCOUNTER
S:  1:10 PM  Call from [facility]  requesting IP MH placement for a 21 YO F    B:  Pt has a hx of MDD w/ psychotic features, anxiety and PTSD presented with worsening depression and SI.  Reports 2 days ago she took 10 sleeping pills and was  disappointed she woke up 5 hours later.  One  pevious SA via overdose.   Current plan is to hang herself by the [place].  Wrote goodbye letters and returned boyfriend's belongings.  Primary stressor is relationship w/ boyfriend. Hx of SIB, cutting.  Last time cut herself 2 days ago, superficial on thigh. Denies hallucinations,  HI.  One previous MH hospitalization one month ago, discharged and was supposed to start IOP.  Didn't go because of college classes and work. Flushed her meds down the toilet shortly after being discharged.    Occasional alcohol use, marijuana nightly.  No cocaine x 14 months.    VSS  CBC w/ platelets, CMP  Unremarkable  Utox POS for cannabinoids  HCG NEG  COVID-in process    A:  voluntary    R:  Patient cleared and ready for behavioral bed placement: Yes     R:  2:20 PM  Patient accepted by Ana REYES/Gilberto  on station 4AW.  Placed in 4AW work queue.    R:  2:31 PM  Disposition given to 4AW charge nurse.  Patient can transfer after nurse to nurse at 4 PM  Call to [facility] with update.

## 2021-07-13 NOTE — SAFE
Jacklyn Ho  July 13, 2021    Patient is a 20 year old female presenting to the ED by ambulance with suicidal ideation and a plan to hang herself.  Recent suicide attempt on 7/11 by overdose of sleeping pills. SIB, cutting, last cut on Sunday.  Off medications, flushed them down the toilet about a month ago.  Recent discharge from inpatient (69-6/19/21) with recommendation for PHP.  Patient reports she did not do PHP due to conflict with work.  Using marijuana nightly, occasional use of alcohol.  UDS positive for cannabis.  Reports she has been clean from cocaine for 14 months.      Current Suicidal Ideation/Self-Injurious Concerns/Methods: Ingestion sleeping pills on Sunday afternoon.  Current plan to hang self.    Inappropriate Sexual Behavior: No    Aggression/Homicidal Ideation: None - N/A      For additional details see full [facility] assessment.       Ludivina Razo

## 2021-07-13 NOTE — PHARMACY-ADMISSION MEDICATION HISTORY
Admission Medication History Completed by Pharmacy    See [facility] Admission Navigator for allergy information, preferred outpatient pharmacy, prior to admission medications and immunization status.     Medication history sources:  patient, [employer]    Pertinent changes made to PTA medication list:  Added:   - Depo-Provera (per pt only)  Deleted:  - OCP  Changed: N/A    Additional medication history information:   - Patient denies taking any additional Rx/OTC medications other than the ones listed below.    Prior to Admission medications    Medication Sig Last Dose Taking? Auth Provider   FLUoxetine (PROZAC) 10 MG capsule Take 1 capsule (10 mg) by mouth daily Past Month Yes Annabelle Cuellar MD   hydrOXYzine (ATARAX) 25 MG tablet Take 1 tablet (25 mg) by mouth every 4 hours as needed for anxiety Past Month Yes Annabelle Cuellar MD   medroxyPROGESTERone (DEPO-PROVERA) 150 MG/ML IM injection Inject 150 mg into the muscle every 3 months 7/10/2021 Yes Unknown, Entered By History   ramelteon (ROZEREM) 8 MG tablet Take 8 mg by mouth At Bedtime Past Month Yes Unknown, Entered By History     Date completed: 07/13/21    Medication history completed by:   Brice Shearer, PharmD, BCPS  [facility]  Emergency Department: Ascom *[id number]

## 2021-07-13 NOTE — ED PROVIDER NOTES
[facility] EMERGENCY DEPARTMENT ([facility])       7/13/21  History     Chief Complaint   Patient presents with     Suicidal     SI with SIB     The history is provided by the patient and medical records.     Jacklyn Ho is a 20 year old female with a past medical history significant for depression who presents to the Emergency Department for evaluation of suicidal ideation.  The patient took 10 of her sleeping medications (Rozerem 8 mg) the night before last as an attempt to commit suicide.  She awoke the point and that it did not work.  She has worsening suicidal ideations today.  She also perform some cutting on her left thigh.  She denies any coingestions or alcohol use.  The patient stopped taking her medications approximately 1 month ago as she did not feel that they have been helpful.  She has been talking with her psychiatrist about therapy such as DBT but has not yet initiated it as she had been involved in summer camps.  Patient denies any recent illness or medical concerns.  She has been Covid vaccinated.  She denies knowledge of any recent Covid exposures and denies any Covid symptoms.      I have reviewed the Medications, Allergies, Past Medical and Surgical History, and Social History in the [facility] system.  PAST MEDICAL HISTORY: History reviewed. No pertinent past medical history.    PAST SURGICAL HISTORY: History reviewed. No pertinent surgical history.    Past medical history, past surgical history, medications, and allergies were reviewed with the patient. Additional pertinent items: None    FAMILY HISTORY: No family history on file.    SOCIAL HISTORY:   Social History     Tobacco Use     Smoking status: Never Smoker     Smokeless tobacco: Never Used   Substance Use Topics     Alcohol use: Not Currently     Social history was reviewed with the patient. Additional pertinent items: None      Patient's Medications   New Prescriptions    No medications on file   Previous Medications     FLUOXETINE (PROZAC) 10 MG CAPSULE    Take 1 capsule (10 mg) by mouth daily    HYDROXYZINE (ATARAX) 25 MG TABLET    Take 1 tablet (25 mg) by mouth every 4 hours as needed for anxiety    MEDROXYPROGESTERONE (DEPO-PROVERA) 150 MG/ML IM INJECTION    Inject 150 mg into the muscle every 3 months    RAMELTEON (ROZEREM) 8 MG TABLET    Take 8 mg by mouth At Bedtime   Modified Medications    No medications on file   Discontinued Medications    LEVONORGESTREL-ETHINYL ESTRADIOL (NORDETTE) 0.15-30 MG-MCG TABLET    Take 1 tablet by mouth daily        No Known Allergies     Review of Systems   Constitutional: Negative for fever.   HENT: Negative for sore throat.    Eyes: Negative for redness.   Respiratory: Negative for cough and shortness of breath.    Cardiovascular: Negative for chest pain.   Gastrointestinal: Negative for abdominal pain, nausea and vomiting.   Genitourinary: Negative for difficulty urinating and dysuria.   Musculoskeletal: Negative for back pain and neck pain.   Skin: Negative for rash.   Neurological: Negative for headaches.   Psychiatric/Behavioral: Positive for dysphoric mood and suicidal ideas. Negative for sleep disturbance.   All other systems reviewed and are negative.    A complete review of systems was performed with pertinent positives and negatives noted in the HPI, and all other systems negative.    Physical Exam   BP: 124/81  Pulse: 69  Temp: 98.3  F (36.8  C)  Resp: 16  SpO2: 97 %      Physical Exam  Vitals and nursing note reviewed.   Constitutional:       General: She is not in acute distress.     Appearance: Normal appearance. She is not diaphoretic.   HENT:      Head: Atraumatic.      Mouth/Throat:      Pharynx: No oropharyngeal exudate.   Eyes:      General: No scleral icterus.     Pupils: Pupils are equal, round, and reactive to light.   Cardiovascular:      Rate and Rhythm: Normal rate and regular rhythm.      Pulses: Normal pulses.      Heart sounds: Normal heart sounds.   Pulmonary:       Effort: No respiratory distress.      Breath sounds: Normal breath sounds.   Abdominal:      General: There is no distension.   Musculoskeletal:         General: No tenderness. Normal range of motion.   Skin:     General: Skin is warm and dry.      Findings: No rash.   Neurological:      Mental Status: She is alert.      Cranial Nerves: No cranial nerve deficit.      Motor: No weakness.      Coordination: Coordination normal.   Psychiatric:         Mood and Affect: Mood is depressed.         Thought Content: Thought content includes suicidal ideation. Thought content includes suicidal plan.         ED Course          Procedures            EKG Interpretation:      Interpreted by YANIV HUSSEIN MD, MD  Time reviewed: 1012  Symptoms at time of EKG: Ingestion   Rhythm: sinus bradycardia  Rate: 55  Axis: normal  Ectopy: none  Conduction: normal  ST Segments/ T Waves: No ST-T wave changes  Q Waves: none  Comparison to prior: No old EKG available    Clinical Impression: normal EKG    Results for orders placed or performed during the hospital encounter of 07/13/21   Comprehensive metabolic panel     Status: Abnormal   Result Value Ref Range    Sodium 139 133 - 144 mmol/L    Potassium 4.2 3.4 - 5.3 mmol/L    Chloride 110 (H) 94 - 109 mmol/L    Carbon Dioxide (CO2) 24 20 - 32 mmol/L    Anion Gap 5 3 - 14 mmol/L    Urea Nitrogen 13 7 - 30 mg/dL    Creatinine 1.04 0.52 - 1.04 mg/dL    Calcium 9.4 8.5 - 10.1 mg/dL    Glucose 82 70 - 99 mg/dL    Alkaline Phosphatase 69 40 - 150 U/L    AST 24 0 - 45 U/L    ALT 47 0 - 50 U/L    Protein Total 7.4 6.8 - 8.8 g/dL    Albumin 4.1 3.4 - 5.0 g/dL    Bilirubin Total 0.5 0.2 - 1.3 mg/dL    GFR Estimate 78 >60 mL/min/1.73m2   Acetaminophen level     Status: Abnormal   Result Value Ref Range    Acetaminophen <2 (L) 10 - 30 mg/L   Salicylate level     Status: Normal   Result Value Ref Range    Salicylate <2 <20 mg/dL   HCG qualitative urine (UPT)     Status: Normal   Result Value Ref Range    hCG  Urine Qualitative Negative Negative   CBC with platelets and differential     Status: None   Result Value Ref Range    WBC Count 7.2 4.0 - 11.0 10e3/uL    RBC Count 4.29 3.80 - 5.20 10e6/uL    Hemoglobin 13.1 11.7 - 15.7 g/dL    Hematocrit 39.5 35.0 - 47.0 %    MCV 92 78 - 100 fL    MCH 30.5 26.5 - 33.0 pg    MCHC 33.2 31.5 - 36.5 g/dL    RDW 12.1 10.0 - 15.0 %    Platelet Count 262 150 - 450 10e3/uL    % Neutrophils 64 %    % Lymphocytes 28 %    % Monocytes 8 %    % Eosinophils 0 %    % Basophils 0 %    % Immature Granulocytes 0 %    NRBCs per 100 WBC 0 <1 /100    Absolute Neutrophils 4.5 1.6 - 8.3 10e3/uL    Absolute Lymphocytes 2.1 0.8 - 5.3 10e3/uL    Absolute Monocytes 0.6 0.0 - 1.3 10e3/uL    Absolute Eosinophils 0.0 0.0 - 0.7 10e3/uL    Absolute Basophils 0.0 0.0 - 0.2 10e3/uL    Absolute Immature Granulocytes 0.0 <=0.0 10e3/uL    Absolute NRBCs 0.0 10e3/uL   Extra Blue Top Tube     Status: None   Result Value Ref Range    Hold Specimen [facility]    Extra Green Top (Lithium Heparin) Tube     Status: None   Result Value Ref Range    Hold Specimen [facility]    Drug abuse screen 1 urine (ED)     Status: Abnormal   Result Value Ref Range    Amphetamines Urine Screen Negative Screen Negative    Barbiturates Urine Screen Negative Screen Negative    Benzodiazepines Urine Screen Negative Screen Negative    Cannabinoids Urine Screen Positive (A) Screen Negative    Cocaine Urine Screen Negative Screen Negative    Opiates Urine Screen Negative Screen Negative   EKG 12-lead, tracing only     Status: None   Result Value Ref Range    Systolic Blood Pressure  mmHg    Diastolic Blood Pressure  mmHg    Ventricular Rate 55 BPM    Atrial Rate 55 BPM    OH Interval 150 ms    QRS Duration 80 ms     ms    QTc 376 ms    P Axis 61 degrees    R AXIS 46 degrees    T Axis 50 degrees    Interpretation ECG Click View Image link to view waveform and result    Asymptomatic COVID-19 Virus (Coronavirus) by PCR Nasopharyngeal     Status: None (In  process)    Specimen: Nasopharyngeal; Swab    Narrative    The following orders were created for panel order Asymptomatic COVID-19 Virus (Coronavirus) by PCR Nasopharyngeal.  Procedure                               Abnormality         Status                     ---------                               -----------         ------                     SARS-COV2 (COVID-19) Vir...[[id number]]                      In process                   Please view results for these tests on the individual orders.   CBC with platelets differential     Status: None    Narrative    The following orders were created for panel order CBC with platelets differential.  Procedure                               Abnormality         Status                     ---------                               -----------         ------                     CBC with platelets and d...[[id number]]                      Final result                 Please view results for these tests on the individual orders.   Urine Drugs of Abuse Screen     Status: Abnormal    Narrative    The following orders were created for panel order Urine Drugs of Abuse Screen.  Procedure                               Abnormality         Status                     ---------                               -----------         ------                     Drug abuse screen 1 urin...[[id number]]  Abnormal            Final result                 Please view results for these tests on the individual orders.   [place] Draw     Status: None    Narrative    The following orders were created for panel order [place] Draw.  Procedure                               Abnormality         Status                     ---------                               -----------         ------                     Extra Blue Top Tube[[id number]]                              Final result               Extra Green Top (Lithium...[[id number]]                      Final result                 Please view results for these tests on  the individual orders.              The medical record was reviewed and interpreted.  Current labs reviewed and interpreted.  Previous labs reviewed and interpreted.  EKG reviewed and interpreted: Normal intervals.         Results for orders placed or performed during the hospital encounter of 07/13/21 (from the past 24 hour(s))   EKG 12-lead, tracing only   Result Value Ref Range    Systolic Blood Pressure  mmHg    Diastolic Blood Pressure  mmHg    Ventricular Rate 55 BPM    Atrial Rate 55 BPM    TN Interval 150 ms    QRS Duration 80 ms     ms    QTc 376 ms    P Axis 61 degrees    R AXIS 46 degrees    T Axis 50 degrees    Interpretation ECG Click View Image link to view waveform and result    CBC with platelets differential    Narrative    The following orders were created for panel order CBC with platelets differential.  Procedure                               Abnormality         Status                     ---------                               -----------         ------                     CBC with platelets and d...[[id number]]                      Final result                 Please view results for these tests on the individual orders.   Comprehensive metabolic panel   Result Value Ref Range    Sodium 139 133 - 144 mmol/L    Potassium 4.2 3.4 - 5.3 mmol/L    Chloride 110 (H) 94 - 109 mmol/L    Carbon Dioxide (CO2) 24 20 - 32 mmol/L    Anion Gap 5 3 - 14 mmol/L    Urea Nitrogen 13 7 - 30 mg/dL    Creatinine 1.04 0.52 - 1.04 mg/dL    Calcium 9.4 8.5 - 10.1 mg/dL    Glucose 82 70 - 99 mg/dL    Alkaline Phosphatase 69 40 - 150 U/L    AST 24 0 - 45 U/L    ALT 47 0 - 50 U/L    Protein Total 7.4 6.8 - 8.8 g/dL    Albumin 4.1 3.4 - 5.0 g/dL    Bilirubin Total 0.5 0.2 - 1.3 mg/dL    GFR Estimate 78 >60 mL/min/1.73m2   Acetaminophen level   Result Value Ref Range    Acetaminophen <2 (L) 10 - 30 mg/L   Salicylate level   Result Value Ref Range    Salicylate <2 <20 mg/dL   CBC with platelets and differential    Result Value Ref Range    WBC Count 7.2 4.0 - 11.0 10e3/uL    RBC Count 4.29 3.80 - 5.20 10e6/uL    Hemoglobin 13.1 11.7 - 15.7 g/dL    Hematocrit 39.5 35.0 - 47.0 %    MCV 92 78 - 100 fL    MCH 30.5 26.5 - 33.0 pg    MCHC 33.2 31.5 - 36.5 g/dL    RDW 12.1 10.0 - 15.0 %    Platelet Count 262 150 - 450 10e3/uL    % Neutrophils 64 %    % Lymphocytes 28 %    % Monocytes 8 %    % Eosinophils 0 %    % Basophils 0 %    % Immature Granulocytes 0 %    NRBCs per 100 WBC 0 <1 /100    Absolute Neutrophils 4.5 1.6 - 8.3 10e3/uL    Absolute Lymphocytes 2.1 0.8 - 5.3 10e3/uL    Absolute Monocytes 0.6 0.0 - 1.3 10e3/uL    Absolute Eosinophils 0.0 0.0 - 0.7 10e3/uL    Absolute Basophils 0.0 0.0 - 0.2 10e3/uL    Absolute Immature Granulocytes 0.0 <=0.0 10e3/uL    Absolute NRBCs 0.0 10e3/uL   [place] Draw    Narrative    The following orders were created for panel order [place] Draw.  Procedure                               Abnormality         Status                     ---------                               -----------         ------                     Extra Blue Top Tube[[id number]]                              Final result               Extra Green Top (Lithium...[[id number]]                      Final result                 Please view results for these tests on the individual orders.   Extra Blue Top Tube   Result Value Ref Range    Hold Specimen [facility]    Extra Green Top (Lithium Heparin) Tube   Result Value Ref Range    Hold Specimen [facility]    Urine Drugs of Abuse Screen    Narrative    The following orders were created for panel order Urine Drugs of Abuse Screen.  Procedure                               Abnormality         Status                     ---------                               -----------         ------                     Drug abuse screen 1 urin...[[id number]]  Abnormal            Final result                 Please view results for these tests on the individual orders.   HCG qualitative urine (UPT)   Result Value  Ref Range    hCG Urine Qualitative Negative Negative   Drug abuse screen 1 urine (ED)   Result Value Ref Range    Amphetamines Urine Screen Negative Screen Negative    Barbiturates Urine Screen Negative Screen Negative    Benzodiazepines Urine Screen Negative Screen Negative    Cannabinoids Urine Screen Positive (A) Screen Negative    Cocaine Urine Screen Negative Screen Negative    Opiates Urine Screen Negative Screen Negative   Asymptomatic COVID-19 Virus (Coronavirus) by PCR Nasopharyngeal    Specimen: Nasopharyngeal; Swab    Narrative    The following orders were created for panel order Asymptomatic COVID-19 Virus (Coronavirus) by PCR Nasopharyngeal.  Procedure                               Abnormality         Status                     ---------                               -----------         ------                     SARS-COV2 (COVID-19) Vir...[[id number]]                      In process                   Please view results for these tests on the individual orders.     Medications - No data to display          Assessments & Plan (with Medical Decision Making)   20 year old female to the emergency department with suicidal ideations.  She overdosed 2 days ago and continues to feel suicidal.  Medical evaluation does not reveal any acute emergent condition or ill effects from her ingestion.  She appears medically stable for psychiatric evaluation and admission.  Patient to [facility] for further evaluation.    I have reviewed the nursing notes.    I have reviewed the findings, diagnosis, plan and need for follow up with the patient.    New Prescriptions    No medications on file       Final diagnoses:   PTSD (post-traumatic stress disorder)   Suicidal ideation       7/13/2021   [facility] EMERGENCY DEPARTMENT     Shine Bañuelos MD  07/13/21 9300

## 2021-07-14 PROCEDURE — 124N000002 HC R&B MH UMMC

## 2021-07-14 PROCEDURE — G0177 OPPS/PHP; TRAIN & EDUC SERV: HCPCS

## 2021-07-14 PROCEDURE — 99223 1ST HOSP IP/OBS HIGH 75: CPT | Mod: AI | Performed by: CLINICAL NURSE SPECIALIST

## 2021-07-14 RX ORDER — OLANZAPINE 2.5 MG/1
2.5-5 TABLET, FILM COATED ORAL 3 TIMES DAILY PRN
Status: DISCONTINUED | OUTPATIENT
Start: 2021-07-14 | End: 2021-07-20 | Stop reason: HOSPADM

## 2021-07-14 RX ORDER — OLANZAPINE 10 MG/2ML
5 INJECTION, POWDER, FOR SOLUTION INTRAMUSCULAR 3 TIMES DAILY PRN
Status: DISCONTINUED | OUTPATIENT
Start: 2021-07-14 | End: 2021-07-20 | Stop reason: HOSPADM

## 2021-07-14 ASSESSMENT — ACTIVITIES OF DAILY LIVING (ADL)
HYGIENE/GROOMING: INDEPENDENT
LAUNDRY: WITH SUPERVISION
DRESS: INDEPENDENT
ORAL_HYGIENE: INDEPENDENT

## 2021-07-14 NOTE — PLAN OF CARE
INITIAL OT NOTE  Problem: OT General Care Plan  Goal: OT Goal 1  Description: Pt will practice using >2 coping strategies to manage stress and reduce symptoms to demonstrate increased readiness for discharge.     Pt actively participated in occupational therapy clinic. Purpose of structured group: exploration/development of positive coping skills, engagement in creative expression and clinical observation of social, cognitive, and kinesthetic performance skills. Pt response: chosen activity: stained glass. Pt was able to ask for assistance as needed, and independently initiate self-selected task. Pt demonstrated good focus and safety while using materials (SIO present). Pt did not acknowledge offered compliments on her work. Minimal interactions with peers but social with SIO.

## 2021-07-14 NOTE — PLAN OF CARE
Assessment/Intervention/Current Symtoms and Care Coordination  The patient's care was discussed with the treatment team and chart notes were reviewed. Team note completed. Met patient to complete personal plan of care and psychosocial assessment    Discharge Plan or Goal  Home with outpatient appointments    Barriers to Discharge   Patient is newly admitted and evaluation is in process    Referral Status  None    Legal Status   Voluntary

## 2021-07-14 NOTE — PROGRESS NOTES
Pt appeared to have slept for 7 hours.  Breathing is even and unlabored.  No prns/medical concerns this shift

## 2021-07-14 NOTE — PROGRESS NOTES
07/13/21 2158   Valuables   Patient Belongings remains with patient;locker;sent to security per site process   Patient Belongings Remaining with Patient clothing;earrings   Patient Belongings Put in Hospital Secure Location (Security or Locker, etc.) money (see comment);keys;shoes;tote;cell phone/electronics;other (see comments)   Did you bring any home meds/supplements to the hospital?  No     In locker: 1 black mask, 1 pair of underwear, red drawstring bag, plastic water bottle, 2 pens, 1 pencil, whistle, 2 lanyards, hand , lip balm, keys, cell phone, 2 plastic bags, paint by sticker sheets, shorts with drawstring, swim suit top and bottoms, shoes with laces, 1 pair of socks, several hair ties    With patient: 1 sports bra, lounge pants, shirt, 1 earring in ear    Sent to security: $0.25        A               Admission:  I am responsible for any personal items that are not sent to the safe or pharmacy.  [facility] is not responsible for loss, theft or damage of any property in my possession.    Signature:  _________________________________ Date: _______  Time: _____                                              Staff Signature:  ____________________________ Date: ________  Time: _____      2nd Staff person, if patient is unable/unwilling to sign:    Signature: ________________________________ Date: ________  Time: _____     Discharge:  [facility] has returned all of my personal belongings:    Signature: _________________________________ Date: ________  Time: _____                                          Staff Signature:  ____________________________ Date: ________  Time: _____

## 2021-07-14 NOTE — PLAN OF CARE
"  Problem: Suicidal Behavior  Goal: Suicidal Behavior is Absent or Managed  Outcome: No Change   This RN had a talk about her SIO and pt stated, \"well, you can take me off the SIO but it's keeping me from hurting myself\"! Pt states she was on sta 20 recently but not on an SIO as we discussed this. Pt up and showered this morning.  "

## 2021-07-14 NOTE — PROGRESS NOTES
"PT and Writer, on 1:1, were sitting in lounge while PT was eating breakfast. PT seemed to be compliant at first. PT then picked up her knife from her tray and asked the Writer \"Do you think this is sharp enough to cut though a persons skin?\" Writer redirected PT from the subject while also removing the knife from the PT's tray. Writer notified RN after the interaction in order to set up a \"no silver wear\" order moving forward.   "
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
## 2021-07-14 NOTE — ED NOTES
ED to Behavioral Floor Handoff    SITUATION  Jacklyn Ho is a 20 year old female who speaks English and lives in a home with family members The patient arrived in the ED by private car from home with a complaint of Suicidal (SI with SIB)  .The patient's current symptoms started/worsened 4 week(s) ago and during this time the symptoms have remained the same.   In the ED, pt was diagnosed with   Final diagnoses:   PTSD (post-traumatic stress disorder)   Suicidal ideation        Initial vitals were: BP: 124/81  Pulse: 69  Temp: 98.3  F (36.8  C)  Resp: 16  SpO2: 97 %   --------  Is the patient diabetic? No   If yes, last blood glucose? --     If yes, was this treated in the ED? --  --------  Is the patient inebriated (ETOH) No or Impaired on other substances? No  MSSA done? N/A  Last MSSA score: --    Were withdrawal symptoms treated? N/A  Does the patient have a seizure history? No. If yes, date of most recent seizure--  --------  Is the patient patient experiencing suicidal ideation? reports the following suicide factors: Has plan to hang herself from a bridge    Homicidal ideation? denies current or recent homicidal ideation or behaviors.    Self-injurious behavior/urges? Yes  ------  Was pt aggressive in the ED No  Was a code called No  Is the pt now cooperative? Yes  -------  Meds given in ED: Medications - No data to display   Family present during ED course? No  Family currently present? No    BACKGROUND  Does the patient have a cognitive impairment or developmental disability? No  Allergies: No Known Allergies.   Social demographics are   Social History     Socioeconomic History     Marital status: Single     Spouse name: None     Number of children: None     Years of education: None     Highest education level: None   Occupational History     None   Tobacco Use     Smoking status: Never Smoker     Smokeless tobacco: Never Used   Substance and Sexual Activity     Alcohol use: Not Currently     Drug use:  Not Currently     Sexual activity: None   Other Topics Concern     None   Social History Narrative     None     Social Determinants of Health     Financial Resource Strain:      Difficulty of Paying Living Expenses:    Food Insecurity:      Worried About Running Out of Food in the Last Year:      Ran Out of Food in the Last Year:    Transportation Needs:      Lack of Transportation (Medical):      Lack of Transportation (Non-Medical):    Physical Activity:      Days of Exercise per Week:      Minutes of Exercise per Session:    Stress:      Feeling of Stress :    Social Connections:      Frequency of Communication with Friends and Family:      Frequency of Social Gatherings with Friends and Family:      Attends [employer] Services:      Active Member of Clubs or Organizations:      Attends Club or Organization Meetings:      Marital Status:    Intimate Partner Violence:      Fear of Current or Ex-Partner:      Emotionally Abused:      Physically Abused:      Sexually Abused:         ASSESSMENT  Labs results   Labs Ordered and Resulted from Time of ED Arrival Up to the Time of Departure from the ED   COMPREHENSIVE METABOLIC PANEL - Abnormal; Notable for the following components:       Result Value    Chloride 110 (*)     All other components within normal limits   ACETAMINOPHEN LEVEL - Abnormal; Notable for the following components:    Acetaminophen <2 (*)     All other components within normal limits   DRUG ABUSE SCREEN 1 URINE (ED) - Abnormal; Notable for the following components:    Cannabinoids Urine Screen Positive (*)     All other components within normal limits   SALICYLATE LEVEL - Normal   HCG QUALITATIVE URINE - Normal   SARS-COV2 (COVID-19) VIRUS RT-PCR - Normal    Narrative:     Testing was performed using the [employer]ia  SARS-CoV-2 & Influenza A/B Assay on the arcelia  Glory  System.  This test should be ordered for the detection of SARS-COV-2 in individuals who meet SARS-CoV-2 clinical and/or epidemiological  criteria. Test performance is unknown in asymptomatic patients.  This test is for in vitro diagnostic use under the FDA EUA for laboratories certified under CLIA to perform moderate and/or high complexity testing. This test has not been FDA cleared or approved.  A negative test does not rule out the presence of PCR inhibitors in the specimen or target RNA in concentration below the limit of detection for the assay. The possibility of a false negative should be considered if the patient's recent exposure or clinical presentation suggests COVID-19.  [facility] are certified under the Clinical Laboratory Improvement Amendments of 1988 (CLIA-88) as qualified to perform moderate and/or high complexity laboratory testing.   CBC WITH PLATELETS AND DIFFERENTIAL   EXTRA BLUE TOP TUBE   EXTRA GREEN TOP (LITHIUM HEPARIN) TUBE   COVID-19 VIRUS (CORONAVIRUS) BY PCR    Narrative:     The following orders were created for panel order Asymptomatic COVID-19 Virus (Coronavirus) by PCR Nasopharyngeal.  Procedure                               Abnormality         Status                     ---------                               -----------         ------                     SARS-COV2 (COVID-19) Vir...[[id number]]  Normal              Final result                 Please view results for these tests on the individual orders.   CBC WITH PLATELETS & DIFFERENTIAL    Narrative:     The following orders were created for panel order CBC with platelets differential.  Procedure                               Abnormality         Status                     ---------                               -----------         ------                     CBC with platelets and d...[[id number]]                      Final result                 Please view results for these tests on the individual orders.   URINE DRUGS OF ABUSE SCREEN    Narrative:     The following orders were created for panel order Urine Drugs of Abuse Screen.  Procedure                                Abnormality         Status                     ---------                               -----------         ------                     Drug abuse screen 1 urin...[[id number]]  Abnormal            Final result                 Please view results for these tests on the individual orders.   EXTRA TUBE    Narrative:     The following orders were created for panel order [place] Draw.  Procedure                               Abnormality         Status                     ---------                               -----------         ------                     Extra Blue Top Tube[[id number]]                              Final result               Extra Green Top (Lithium...[[id number]]                      Final result                 Please view results for these tests on the individual orders.      Imaging Studies: No results found for this or any previous visit (from the past 24 hour(s)).   Most recent vital signs /81   Pulse 69   Temp 98.3  F (36.8  C) (Oral)   Resp 16   LMP  (LMP Unknown)   SpO2 97%   Breastfeeding No    Abnormal labs/tests/findings requiring intervention:---   Pain control: pt had none  Nausea control: pt had none    RECOMMENDATION  Are any infection precautions needed (MRSA, VRE, etc.)? No If yes, what infection? --  ---  Does the patient have mobility issues? independently. If yes, what device does the pt use? ---  ---  Is patient on 72 hour hold or commitment? No If on 72 hour hold, have hold and rights been given to patient? N/A  Are admitting orders written if after 10 p.m. ?N/A  Tasks needing to be completed:---     Samson Negro RN   [facility]-- [phone] [facility]   [phone] [facility]

## 2021-07-14 NOTE — PLAN OF CARE
Problem: General Plan of Care (Inpatient Behavioral)  Goal: Individualization/Patient Specific Goal (IP Behavioral)  Description: The patient and/or their representative will achieve their patient-specific goals related to the plan of care.    The patient-specific goals include:  Flowsheets (Taken 7/14/2021 1254)  Areas of Vulnerability: Chronic SI and multiple suicide attempts, substance use, past abuse, medication noncompliance  Patient Strengths:   Steady employment   Involved in community   Stable and supportive family   Utilized support systems   Stable housing   School engagement   Has vocational interests i.e., hobbies   Awareness of substance use issues    The patient completed the reasons for admit and goals for discharge in the personal plan of care.   Reasons for admit:  1)  Trying to kill myself  2)  Cutting    Goals for discharge:  1)  Be happy

## 2021-07-14 NOTE — PLAN OF CARE
Problem: General Plan of Care (Inpatient Behavioral)  Goal: Team Discussion  Description: Team Plan:  Outcome: No Change  Note: BEHAVIORAL TEAM DISCUSSION    Participants: Debra Naegele APRN, Neena MCGINNIS, Samson Butts RN  Progress: New admit  Anticipated length of stay: 5-7 days  Continued Stay Criteria/Rationale: Suicidal ideation and attempt  Medical/Physical: No acute medical cncerns  Precautions:   Behavioral Orders   Procedures    Code 1 - Restrict to Unit    Routine Programming     As clinically indicated    Self Injury Precaution    Status Individual Observation     Patient SIO status reviewed with team/RN.  Please also refer to RN/team documentation for add'l detail.     -SIO staff to monitor following which have contributed to patient being on SIO:   Patient unable to contract for safety. SI, SIB   -Possible interventions SIO staff could use to support patient's treatment progress:   -When following observed, team will review discontinuation of SIO:   Pt displays calm and safe behaviors and can contract for safety on the unit     Order Specific Question:   CONTINUOUS 24 hours / day     Answer:   5 feet     Order Specific Question:   Indications for SIO     Answer:   Self-injury risk     Order Specific Question:   Indications for SIO     Answer:   Suicide risk    Suicide precautions     Patients on Suicide Precautions should have a Combination Diet ordered that includes a Diet selection(s) AND a Behavioral Tray selection for Safe Tray - with utensils, or Safe Tray - NO utensils       Plan: Psychiatric Assessment. Medication Management. Therapeutic Mileu. Individual and group support.   Rationale for change in precautions or plan: Initial plan

## 2021-07-14 NOTE — PLAN OF CARE
"  Problem: Adult Inpatient Plan of Care  Goal: Absence of Hospital-Acquired Illness or Injury  Outcome: Improving  Goal: Optimal Comfort and Wellbeing  Outcome: Improving     Problem: Depression  Goal: Improved Mood  Outcome: Improving     Problem: General Plan of Care (Inpatient Behavioral)  Goal: Individualization/Patient Specific Goal (IP Behavioral)  Description: The patient and/or their representative will achieve their patient-specific goals related to the plan of care.    The patient-specific goals include:  Outcome: Improving  Flowsheets (Taken 7/14/2021 7412)  Patient Strengths: Optimistic that change can occur     Patient has been visible on the unit most part of the shift, talks on the phone a lot, patient endorses anxiety and depression, declines intervention when offered, verbalized\" I don't feel good, still endorses SI psych associate found a 5 page letter in patient's room, in that letter, patient wrote \" I want something sharp, multiple times in the letter, a spoon was found under patient's pillow, , flat affect on approach, continues on SIO due to SI, not deana for safety, patient offers no other known concerns at this time, will continue to offer support as needed per plan pf care  "

## 2021-07-14 NOTE — PROGRESS NOTES
"Guillermo Ho is a 20 yr old female presenting on 4a from [facility] for Depression, SI, SIB. Patient attempted a suicide 2 days ago by ingesting 10 of her prescribed sleeping medication. Patient states she was disappointed when she woke up a few hrs later and realized it did not work. Patient has a new plan to hang self from a bridge in [facility]. Patient states she has attempted suicide 6-7 times in the past but was unsuccessful. Patient pervious admission  Patient has engaged in SIB by cutting. Patient points out problems in her romantic relationship as the main stressor leading to her suicidal thoughts.Patient has not been taking her prescribed medications for the past month.Patient is unable to commit for safety on the unit stating: 'I'm have active thoughts of hurting my self and I plan on acting on them  As soon as I can\" Patient currently placed on 1:1 SIO for SI, SIB.     "
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
## 2021-07-15 PROCEDURE — 99233 SBSQ HOSP IP/OBS HIGH 50: CPT | Performed by: CLINICAL NURSE SPECIALIST

## 2021-07-15 PROCEDURE — 90853 GROUP PSYCHOTHERAPY: CPT

## 2021-07-15 PROCEDURE — G0177 OPPS/PHP; TRAIN & EDUC SERV: HCPCS

## 2021-07-15 PROCEDURE — 250N000013 HC RX MED GY IP 250 OP 250 PS 637: Performed by: PSYCHIATRY & NEUROLOGY

## 2021-07-15 PROCEDURE — 124N000002 HC R&B MH UMMC

## 2021-07-15 PROCEDURE — 99207 PR CDG-MDM COMPONENT: MEETS HIGH - UP CODED: CPT | Performed by: CLINICAL NURSE SPECIALIST

## 2021-07-15 RX ADMIN — HYDROXYZINE HYDROCHLORIDE 50 MG: 50 TABLET, FILM COATED ORAL at 21:46

## 2021-07-15 RX ADMIN — TRAZODONE HYDROCHLORIDE 50 MG: 50 TABLET ORAL at 01:29

## 2021-07-15 RX ADMIN — HYDROXYZINE HYDROCHLORIDE 50 MG: 50 TABLET, FILM COATED ORAL at 01:29

## 2021-07-15 RX ADMIN — TRAZODONE HYDROCHLORIDE 50 MG: 50 TABLET ORAL at 21:46

## 2021-07-15 NOTE — PLAN OF CARE
"  Problem: Suicidal Behavior  Goal: Suicidal Behavior is Absent or Managed  Outcome: No Change   Pt continues to say if this RN takes her off the SIO that it's \"the only reason she doesn't hurt herself\"! Pt was on the SIO last evening and did some head banging. She is labile & incongruent as to her thoughts as she smiles at this RN when stating she may hurt herself. She remains on a strict SIO.  "

## 2021-07-15 NOTE — PLAN OF CARE
Problem: Suicidal Behavior  Goal: Suicidal Behavior is Absent or Managed  7/15/2021 1858 by Robb Guillen RN  Outcome: No Change  Flowsheets (Taken 7/15/2021 1858)  Mutually Determined Action Steps (Suicidal Behavior Absent/Managed):    verbalizes safety check rationale    shares suicidal thoughts    Patient continues to be on 1:1 SIO to ensure safety. Patient  endorses anxiety and depression, has suicidal thoughts  with no stated  specific plan, refused any PRN meds. Patient is visible in the milieu most of the shift, pt said having her SIO staff keeps her safe. She ate 80% of her dinner, remained calm and cooperative. Patient able to participate in groups.  Patient requested for trazodone and hydroxyzine before bedtime.  Will continue to monitor.

## 2021-07-15 NOTE — PROGRESS NOTES
" At 0115 pt became somewhat  agitated and started headbanging.  Pt reported she was triggered by the other pt that was having a hard time.  Writer reassured pt that she was safe and offered her a prn.  Pt declined but reported she will ask \"for sleep aid if/when I need it\".    0130.  Pt came out of her room requesting for\"something to relax and sleep\".  Prn Trazodone 50 mg and Hydroxyzine 50 mg given.  Pt thanked RN and went back to her room.    0223.  Pt sleeping soundly at this time.  Will continue to monitor.  "

## 2021-07-15 NOTE — PROGRESS NOTES
Focused skin assessment: Left upper top thigh; skin assessed with another female nurse in room. Patient reports no pain, itching, nor tenderness. Skin characteristics WDL; skin is healed with superficial scarring; skin is free of scabbing and drainage. Patient reports no other lacerations nor abrasions to assess. Patient will notify staff if characteristics of leg changes. No further assessment needed for this area of skin at this time.

## 2021-07-15 NOTE — PROGRESS NOTES
"[facility], [place]   Psychiatric Progress Note        Interim History:   The patient's care was discussed with the treatment team during the daily team meeting and/or staff's chart notes were reviewed.  Staff report patient was visible in the milieu and attended some groups.     Psychiatric symptoms and interventions:   Upon interview with patient, is labile. She has been going to groups but has been agitated/sarcastic with individual staff.     Patient continues to report suicidal thinking with a plan. Last evening she engaged in head banging because she was agitated by another patient.     Treatment team discussed 1:1 and due to safety concerns patient will remain on 1:1.    Discussed medications. Patient is reporting that she did not take Prozac because she thought she would develop heart problems. She is reporting that hydroxyzine is helpful.     Provider will present Lexapro to patient as an alternative to Prozac.          Medications:          Allergies:   No Known Allergies       Labs:   No results found for this or any previous visit (from the past 24 hour(s)).       Psychiatric Examination:     /79   Pulse 59   Temp 98.3  F (36.8  C) (Oral)   Resp 16   Ht 1.76 m (5' 9.29\")   Wt 78.3 kg (172 lb 9.9 oz)   LMP  (LMP Unknown)   SpO2 100%   Breastfeeding No   BMI 25.28 kg/m    Weight is 172 lbs 9.92 oz  Body mass index is 25.28 kg/m .  Orthostatic Vitals       Most Recent      Sitting Orthostatic /79 07/14 0800    Sitting Orthostatic Pulse (bpm) 59 07/14 0800    Standing Orthostatic /86 07/14 0800    Standing Orthostatic Pulse (bpm) 60 07/14 0800            Appearance: awake, alert, adequately groomed and dressed in hospital scrubs  Attitude:  evasive, guarded and uncooperative  Eye Contact:  poor   Mood:  angry, anxious and depressed  Affect:  intensity is flat  Speech:  normal prosody, sarcastic  Psychomotor Behavior:  no evidence of tardive dyskinesia, " "dystonia, or tics  Throught Process:  goal oriented  Associations:  no loose associations  Thought Content:  active suicidal ideation present and thoughts of self-harm, which are remained the same  Insight:  limited  Judgement:  limited  Oriented to:  time, person, and place  Attention Span and Concentration:  intact  Recent and Remote Memory:  intact    Clinical Global Impressions  First:     Most recent:            Precautions:     Behavioral Orders   Procedures     Code 1 - Restrict to Unit     Routine Programming     As clinically indicated     Self Injury Precaution     Status Individual Observation     Patient SIO status reviewed with team/RN.  Please also refer to RN/team documentation for add'l detail.     -SIO staff to monitor following which have contributed to patient being on SIO:   Patient unable to contract for safety. SI, SIB   -Possible interventions SIO staff could use to support patient's treatment progress:   -When following observed, team will review discontinuation of SIO:   Pt displays calm and safe behaviors and can contract for safety on the unit     Order Specific Question:   CONTINUOUS 24 hours / day     Answer:   5 feet     Order Specific Question:   Indications for SIO     Answer:   Self-injury risk     Order Specific Question:   Indications for SIO     Answer:   Suicide risk     Suicide precautions     Patients on Suicide Precautions should have a Combination Diet ordered that includes a Diet selection(s) AND a Behavioral Tray selection for Safe Tray - with utensils, or Safe Tray - NO utensils            DIagnoses:   1.  Major depressive disorder, recurrent, severe, without psychosis.  2.  Borderline personality disorder.  3.  Cannabis use disorder.  4.  History of stimulant use disorder.         Plan:     Legal status: Voluntary     Medication management:   Patient is declining medications, \"I don't  like how I feel when I take them\".     Medical:  No acute issues.  Reviewed admission " labs unremarkable.  HCG negative.  COVID screen negative.  EKG completed in the ED is normal.    Behavioral/psychology/social:   Encouraged patient to attend therapeutic programming as tolerated.   Treatment team discussed 1:1: patient has plan of breaking glass in bathroom or finding something sharp to cut herself. Last night plastic ware was found underneath her pillow.     FINGER FOODS, NO PLASTIC WARE    Patient may  have stuffed pineapple in her room only.     Disposition:   Reason for continued hospitalization: Patient has urges for self harm and suicidal thinking. She is on 1:1 for safety reasons.   Stabilization with medications, provide structured and supportive environment, groups  Estimated length of stay is 3-5 days   Disposition plan: return to home with IOP/DBT

## 2021-07-15 NOTE — PROGRESS NOTES
INITIAL OT ASSESSMENT     07/15/21 1200   General Information   Date Initially Attended OT 07/14/21   Clinical Impression   Affect Flat;Appropriate to situation   Orientation Oriented to person, place and time   Appearance and ADLs General cleanliness observed in most areas   Attention to Internal Stimuli No observed signs   Interaction Skills Interacts appropriately with staff;Interacts appropriately with peers   Ability to Communicate Needs Independent   Verbal Content Clear;Appropriate to topic;Articulate   Ability to Maintain Boundaries Maintains appropriate physical boundaries;Maintains appropriate verbal boundaries   Participation Initiates participation   Concentration Concentrates 20-30 minutes   Ability to Concentrate With structure   Follows and Comprehends Directions Independently follows multi-step directions   Memory Delayed and immediate recall intact   Organization Independently organizes all tasks   Decision Making Independent   Planning and Problem Solving Occasionally needs assist/feedback;Indentifies problems but not alternatives   Ability to Apply and Learn Concepts Comprehends concepts, but needs assist to apply   Frustrations / Stress Tolerance Independently identifies sources of frustration/stress   Level of Insight Some insight   Self Esteem Accepts positive feedback;Takes risks with support and encouragement;Can identify positives   Social Supports Has knowledge of support systems

## 2021-07-15 NOTE — PLAN OF CARE
"Problem: OT General Care Plan  Goal: OT Goal 1  Description: Pt will practice using >2 coping strategies to manage stress and reduce symptoms to demonstrate increased readiness for discharge.     Pt actively participated in a structured occupational therapy group with a focus on strength exploration and self-reflection. Pt demonstrated active listening and independently identified personal strengths that assist with relationship communication and personal fulfillment. Pt shared thoughtful contributions with peers during the group brainstorm, but left before each peer individually shared about their strengths. Pt appeared to have completed the individual task however.    Pt actively participated in occupational therapy clinic. Purpose of structured group: exploration/development of positive coping skills, engagement in creative expression and clinical observation of social, cognitive, and kinesthetic performance skills. Pt response: chosen activity: personal collage. IND to initiate, gather materials, sequence and adjust to workspace demands as needed. Demonstrated good focus and planning. Pt verbalized assistance as needed and appeared comfortable interacting with peers/staff. Pt occasionally made edgy comments such as \"too bad we don't have playboy magazines. Everyone would love that\". Pt was redirectable and understanding of explanation provided to her. Overall appeared to be engaged and enjoying herself during group time.     Pt attended discussion and hands-on activity focused on identifying current struggles with time management and how that relates to mental health symptoms. The focus was also on possible skills and strategies one might use to improve time management skills. Initially, pt appeared disinterested, gazing out the window. Once prompted however, she brightened and contributed to the group discussion. Pt shared a skill she would like to try upon discharge: \"arrange your work time to keep " "interruptions to a minimum\". Pt completed the hands-on activity independently: creating and laminating a white board daily planner to encourage goal-setting, routine, and time management.     Outcome: Improving     "

## 2021-07-15 NOTE — PROGRESS NOTES
Pt's mother called at 0230 requesting an update on pt's status.  Pt did not sigh [place] for her mother.  Pt's mother was told to call back during the day.      Pt appeared to have slept for 4.25 hours.  While asleep, breathing is even and unlabored.  No medical concerns this shift.

## 2021-07-15 NOTE — PLAN OF CARE
Assessment/Intervention/Current Symtoms and Care Coordination  The patient's care was discussed with the treatment team and chart notes were reviewed. Started AVS. Scheduled psychiatry appointment. Seeking DBT programs.     Discharge Plan or Goal  Home with outpatient appointments and DBT     Barriers to Discharge   Patient has SI and SIB urges. She is on SIO for safety.     Referral Status  Plan to refer to DBT     Legal Status   Voluntary

## 2021-07-16 PROCEDURE — 250N000013 HC RX MED GY IP 250 OP 250 PS 637: Performed by: PSYCHIATRY & NEUROLOGY

## 2021-07-16 PROCEDURE — 124N000002 HC R&B MH UMMC

## 2021-07-16 PROCEDURE — 99233 SBSQ HOSP IP/OBS HIGH 50: CPT | Performed by: CLINICAL NURSE SPECIALIST

## 2021-07-16 PROCEDURE — G0177 OPPS/PHP; TRAIN & EDUC SERV: HCPCS

## 2021-07-16 PROCEDURE — H2032 ACTIVITY THERAPY, PER 15 MIN: HCPCS

## 2021-07-16 PROCEDURE — 99207 PR CDG-MDM COMPONENT: MEETS HIGH - UP CODED: CPT | Performed by: CLINICAL NURSE SPECIALIST

## 2021-07-16 PROCEDURE — 250N000013 HC RX MED GY IP 250 OP 250 PS 637: Performed by: CLINICAL NURSE SPECIALIST

## 2021-07-16 RX ORDER — ESCITALOPRAM OXALATE 10 MG/1
10 TABLET ORAL DAILY
Status: DISCONTINUED | OUTPATIENT
Start: 2021-07-16 | End: 2021-07-20 | Stop reason: HOSPADM

## 2021-07-16 RX ADMIN — HYDROXYZINE HYDROCHLORIDE 50 MG: 50 TABLET, FILM COATED ORAL at 19:55

## 2021-07-16 RX ADMIN — ACETAMINOPHEN 650 MG: 325 TABLET, FILM COATED ORAL at 19:55

## 2021-07-16 RX ADMIN — TRAZODONE HYDROCHLORIDE 50 MG: 50 TABLET ORAL at 21:22

## 2021-07-16 RX ADMIN — ESCITALOPRAM OXALATE 10 MG: 10 TABLET ORAL at 12:38

## 2021-07-16 RX ADMIN — DOCUSATE SODIUM AND SENNOSIDES 1 TABLET: 8.6; 5 TABLET ORAL at 17:53

## 2021-07-16 RX ADMIN — HYDROXYZINE HYDROCHLORIDE 50 MG: 50 TABLET, FILM COATED ORAL at 09:22

## 2021-07-16 ASSESSMENT — ACTIVITIES OF DAILY LIVING (ADL): HYGIENE/GROOMING: INDEPENDENT

## 2021-07-16 NOTE — PROGRESS NOTES
"[facility], [place]   Psychiatric Progress Note        Interim History:   The patient's care was discussed with the treatment team during the daily team meeting and/or staff's chart notes were reviewed.  Staff report patient is visible in the milieu and attends groups.     Psychiatric symptoms and interventions:   Upon interview with patient, was agreeable to starting escitalozprm 10 mg to address both depressin and anxieyt. She can continue to use hydorzyone for anxiety.     Patietn rpeorts she completed DBT worksheets. She declined to talk about the workshets and siad they were \"worthless.\"     Patient wants to pursue DBT outpatient.     Patient reports she wants to work on \"being happy\". She reports she is depressed and will harm herself if she is not on a 1:1.     Treatment team discussed 1:1 and feel patient continues to be a high safety risk.          Medications:          Allergies:   No Known Allergies       Labs:   No results found for this or any previous visit (from the past 24 hour(s)).       Psychiatric Examination:     /60   Pulse 60   Temp 96.9  F (36.1  C)   Resp 16   Ht 1.76 m (5' 9.29\")   Wt 78.3 kg (172 lb 9.9 oz)   LMP  (LMP Unknown)   SpO2 100%   Breastfeeding No   BMI 25.28 kg/m    Weight is 172 lbs 9.92 oz  Body mass index is 25.28 kg/m .  Orthostatic Vitals       Most Recent      Sitting Orthostatic /78 07/15 1600    Sitting Orthostatic Pulse (bpm) 83 07/15 1600         Appearance: awake, alert, adequately groomed and dressed in hospital scrubs  Attitude:  evasive, guarded and uncooperative  Eye Contact:  poor   Mood:  angry, anxious and depressed  Affect:  intensity is flat  Speech:  normal prosody, sarcastic  Psychomotor Behavior:  no evidence of tardive dyskinesia, dystonia, or tics  Throught Process:  goal oriented  Associations:  no loose associations  Thought Content:  active suicidal ideation present and thoughts of self-harm, which are " "remained the same  Insight:  limited  Judgement:  limited  Oriented to:  time, person, and place  Attention Span and Concentration:  intact  Recent and Remote Memory:  intact    Clinical Global Impressions  First:     Most recent:            Precautions:     Behavioral Orders   Procedures     Code 1 - Restrict to Unit     Routine Programming     As clinically indicated     Self Injury Precaution     Status Individual Observation     Patient SIO status reviewed with team/RN.  Please also refer to RN/team documentation for add'l detail.     -SIO staff to monitor following which have contributed to patient being on SIO:   Patient unable to contract for safety. SI, SIB   -Possible interventions SIO staff could use to support patient's treatment progress:   -When following observed, team will review discontinuation of SIO:   Pt displays calm and safe behaviors and can contract for safety on the unit     Order Specific Question:   CONTINUOUS 24 hours / day     Answer:   5 feet     Order Specific Question:   Indications for SIO     Answer:   Self-injury risk     Order Specific Question:   Indications for SIO     Answer:   Suicide risk     Suicide precautions     Patients on Suicide Precautions should have a Combination Diet ordered that includes a Diet selection(s) AND a Behavioral Tray selection for Safe Tray - with utensils, or Safe Tray - NO utensils            DIagnoses:   1.  Major depressive disorder, recurrent, severe, without psychosis.  2.  Borderline personality disorder.  3.  Cannabis use disorder.  4.  History of stimulant use disorder.         Plan:      Legal status: Voluntary      Medication management:   Patient is declining medications, \"I don't  like how I feel when I take them\".      Medical:  No acute issues.  Reviewed admission labs unremarkable.  HCG negative.  COVID screen negative.  EKG completed in the ED is normal.     Behavioral/psychology/social:   Encouraged patient to attend therapeutic " programming as tolerated.   Treatment team discussed 1:1: patient has plan of breaking glass in bathroom or finding something sharp to cut herself. Last night plastic ware was found underneath her pillow.      FINGER FOODS, NO PLASTIC WARE     Patient may  have stuffed pineapple in her room only.      Disposition:   Reason for continued hospitalization: Patient has urges for self harm and suicidal thinking. She is on 1:1 for safety reasons.   Stabilization with medications, provide structured and supportive environment, groups  Estimated length of stay is 3-5 days   Disposition plan: return to home with IOP/DBT

## 2021-07-16 NOTE — PROGRESS NOTES
"Number of patients attending the group:  3  Group Length:  1 Hour     Group Therapy      Summary of Group / Topics Discussed:  The psychotherapy group goal is to promote insight to positive choice and change. Group processing is within a supportive and safe environment. Patients processed emotions using verbal group and expressive psychotherapy interventions.  This group focused on self-reflections and brainstorming on possible precipitating factors of current mental health symptoms, and ways to address the identified factors. Each patient identified one, discussed ways such factors have impacted their functioning. Received feedback from group members on possible strategies for addressing the factors. During the group, each patient was allowed the time to brainstorm and proffer individually-driven solutions, in addition to group feedback.     Assessment: This patient participated in the group and provided feedback to other group members. She also accepted feedback provided by other group members. She presented with a constricted affect- reporting she is concerned about not being able to stop thoughts of self-harm. At some point during the group session, pt was noticed banging her head against her knee (she was in a sitting position). With a prompt that focused on her (a question from this writer, pt was able to focus on answering the question and stopped the head-banging. Did not occur again throughout the rest of the session).  Pt participated in brainstorming and finding healthy ways to cope with thoughts of self-injury. She reported she could not identify a particular coping mechanism.      Patient Response: Pt received feedback from her peers and this writer on ways to cope with thoughts of self-injury. Pt was particularly thankful to group members who provided her with positive compliments on her skills - of hair braiding, and for \"being a good room mate, so far.\"       "

## 2021-07-16 NOTE — PLAN OF CARE
"  Problem: Depression  Goal: Improved Mood  Outcome: No Change    Up and visible in the milieu.  Generally quiet to self--no socialization noted.  Soft spoken.  Has been doing some coloring in the lounge.  Requested Hydroxyzine 0920.  Shortly before lunch SIO staff reported patient mildly banged her head \"a couple of times\".  Interviewed shortly thereafter, sitting quietly in the lounge.  Reports having a \"rough day\"-- and unable to articulate triggers.  Continues to report ongoing vague, non specific, fleeting SI but without a plan or intent.  Feels safe on the unit.  Also reports vague SIB.  Retrieving meal utensils after eating.  Has not attempted this shift thus far.    Rates depression as a \"27\".  Anxiety as \"100\".    Denies hallucinations this morning.    Took Lexapro without problems.  "

## 2021-07-16 NOTE — PLAN OF CARE
Writer spoke with Xiomara ([phone]), patients care coordinator through [facility]. She was recently assigned patient and does not have much information on her. Writer updated on why she was admitted and what has been going on since admission. Xiomara expressed concern about patient continuing work and school with such acute mental health issues. Xiomara would like a phone call on Monday to see how the weekend went and when discharge might be facilitated.

## 2021-07-16 NOTE — PLAN OF CARE
"Assessment/Intervention/Current Symtoms and Care Coordination  The patient's care was discussed with the treatment team and chart notes were reviewed. Patient had been referred to multiple DBT programs, only [facility] called [facility] today and indicated they have a 3 month wait list. Writer checked in with patient and they reported \"feeling fine\". Patient willingly signed [place] for her psychiatrist through [facility] and it was placed in patient's chart.     Writer spoke with patient's Care Coordinator through [facility], Xiomara, who expressed concern that due to severity of patient's current mental health symptoms that it may not be advisable for patient to return to school and work. Xiomara would like an update on Monday (7/19) at some point during the day. Phone number is [phone].      Discharge Plan or Goal  Home with outpatient appointments and DBT. Various referrals were made to DBT programs, [facility] called and said they have a 3 month waiting list. No other DBT programs reached out today. Psychiatry appointment has been scheduled. No discharge date has been set.      Barriers to Discharge   Patient has SI and SIB urges. She is on SIO for safety.     Referral Status  Plan to refer to DBT     Legal Status   Voluntary   "

## 2021-07-16 NOTE — PLAN OF CARE
"  Problem: Suicidal Behavior  Goal: Suicidal Behavior is Absent or Managed  Outcome: No Change  Flowsheets (Taken 7/16/2021 1817)  Mutually Determined Action Steps (Suicidal Behavior Absent/Managed):    verbalizes safety check rationale    shares suicidal thoughts    Patient remains on 1:1 SIO, is visible in the milieu, continues to say that the only reason she does not hurt herself is having a SIO staff. She likes to talk on the phone. Patient said she had constipation, PRN senna given and encouraged to increase fluid intake. Patient rated her anxiety \"15\", offered PRN meds, took PRN atarax, she also complained of headache PRN tylenol given.   Patient requested for PRN trazodone before bedtime.  Will continue to monitor and offer support.  "

## 2021-07-16 NOTE — PLAN OF CARE
Writer checked in with patient who presented as guarded and evasive. Patient willingly signed [place] for psychiatrist Shadia ARELLANO.

## 2021-07-16 NOTE — PROGRESS NOTES
07/16/21 1100   Occupational Therapy   Type of Intervention structured groups   Response Participates   Hours 1   Treatment Detail see note     Pt participated in OT clinic IND, where she initiated a chosen project (making a collage), followed through with plan, and asked for support with supplies as needed. Pt required MIN VCs for appropriate language during group but was easily redirectable and pleasant.

## 2021-07-17 PROCEDURE — H2032 ACTIVITY THERAPY, PER 15 MIN: HCPCS

## 2021-07-17 PROCEDURE — 250N000013 HC RX MED GY IP 250 OP 250 PS 637: Performed by: CLINICAL NURSE SPECIALIST

## 2021-07-17 PROCEDURE — 250N000013 HC RX MED GY IP 250 OP 250 PS 637: Performed by: PSYCHIATRY & NEUROLOGY

## 2021-07-17 PROCEDURE — 124N000002 HC R&B MH UMMC

## 2021-07-17 RX ADMIN — ESCITALOPRAM OXALATE 10 MG: 10 TABLET ORAL at 08:53

## 2021-07-17 RX ADMIN — ACETAMINOPHEN 650 MG: 325 TABLET, FILM COATED ORAL at 21:27

## 2021-07-17 NOTE — PROGRESS NOTES
"PT asked Writer (during 1:1) if they could have a \"suture kit\" in order to sew the leg of their stuffed animal back on. Writer asked what happened to make the toy's leg fall off. PT explained that the toy told her to rip its leg off rather than having the PT hurt herself instead. PT then explained that the toy in question (stuffed pineapple) talks to her frequently but that it has decreased in the past few days because the toy is mad at the PT. The PT explained that the stuffed pineapple is mad at her because \"the doctor said that she cannot leave my room with me anymore\".   "

## 2021-07-17 NOTE — PLAN OF CARE
Music Therapy Group note    Total time in session: 45 minutes    Number of patients in group: 1-4    Scope of service: psychodynamic    Patient progress: initial encounter    Intervention: Music Therapy Preferences Assessment     Goal of group: to determine how music can help the patient best    Patient response/reaction to treatment intervention(s): Jacklyn willingly came to MT group this evening, and was the only patient to participate meaningfully and consistently in session tonight.  She resonates with music of strong women singers (Arabella, Milka Nicanor, etc), and relaxed lying down in her chair and singing along to the music, engaging well.  She appears to really connect with these singers in a meaningful way, and it appears to calm and enliven her and could be a part of a future DBT treatment plan for her.  She was calm and cooperative, interacted appropriately with peers, though many of them were on differing functioning levels at the time.  She expressed appreciation for session.    Mary Beth Bassett, [facility]  Board-Certified Music Therapist

## 2021-07-17 NOTE — PLAN OF CARE
"  Problem: Suicidal Behavior  Goal: Suicidal Behavior is Absent or Managed  Outcome: Declining  Flowsheets (Taken 7/17/2021 1201)  Mutually Determined Action Steps (Suicidal Behavior Absent/Managed):    verbalizes safety check rationale    shares suicidal thoughts  Note: Patient continues to need an SIO 1:1 due to reporting chronic thoughts of suicide and cannot contract for safety. SIO reviewed and provider requested to have order renewed.    No roommate order placed due to patient reporting having internal stimuli; reports her pineapple talks to her. Please see chart note.     Patient reports feeling about the same and says \"I don't see the point to live sometimes.\" Patient asked SIO staff \"do you think someone can die if they jump out the of this floor?\" Patient struggles with low frustration tolerance and treats staff poorly when she is talking to staff by using short answers with a lot of attitude during those times.   Patient is receptive to redirection. Patient was social with peers on unit, watched movies, and spoke on the phone.      " Home

## 2021-07-17 NOTE — PROGRESS NOTES
Pt appeared to have slept for 8 hours.  Breathing is even and unlabored.  No medical concerns this shift.  Remains on SIO with 5 feet restrictions.

## 2021-07-17 NOTE — PLAN OF CARE
"  Problem: Depression  Goal: Improved Mood  Outcome: No Change     Problem: Suicidal Behavior  Goal: Suicidal Behavior is Absent or Managed  Outcome: No Change  Flowsheets (Taken 7/17/2021 3829)  Mutually Determined Action Steps (Suicidal Behavior Absent/Managed):    verbalizes safety check rationale    shares suicidal thoughts     Patient is visible in the milieu, likes to watch movies. Patient has suicidal thoughts with no specific plan, patient said that her stuffed pineapple is telling her \"things\".. pt refused to elaborate, writer asked from 0-10 rate her anxiety and depression, rated her anxiety \"26\" and depression \"17\". Remains on 1:1 SIO to ensure safety. Patient ate 90% of her dinner and participated in groups.  Patient requested for tylenol before bedtime, said she has a headache 10/10.  Patient appeared sleeping after 30mins.  Will continue to monitor.  "

## 2021-07-18 PROCEDURE — 250N000013 HC RX MED GY IP 250 OP 250 PS 637: Performed by: CLINICAL NURSE SPECIALIST

## 2021-07-18 PROCEDURE — 250N000013 HC RX MED GY IP 250 OP 250 PS 637: Performed by: PSYCHIATRY & NEUROLOGY

## 2021-07-18 PROCEDURE — 124N000002 HC R&B MH UMMC

## 2021-07-18 RX ORDER — AMOXICILLIN 250 MG
1 CAPSULE ORAL 2 TIMES DAILY
Status: DISCONTINUED | OUTPATIENT
Start: 2021-07-18 | End: 2021-07-20 | Stop reason: HOSPADM

## 2021-07-18 RX ADMIN — TRAZODONE HYDROCHLORIDE 50 MG: 50 TABLET ORAL at 23:15

## 2021-07-18 RX ADMIN — ESCITALOPRAM OXALATE 10 MG: 10 TABLET ORAL at 08:51

## 2021-07-18 RX ADMIN — DOCUSATE SODIUM AND SENNOSIDES 1 TABLET: 8.6; 5 TABLET ORAL at 21:03

## 2021-07-18 ASSESSMENT — MIFFLIN-ST. JEOR: SCORE: 1605.12

## 2021-07-18 ASSESSMENT — ACTIVITIES OF DAILY LIVING (ADL): HYGIENE/GROOMING: INDEPENDENT

## 2021-07-18 NOTE — PLAN OF CARE
Problem: Suicidal Behavior  Goal: Suicidal Behavior is Absent or Managed  Outcome: No Change  Flowsheets (Taken 7/18/2021 1719)  Mutually Determined Action Steps (Suicidal Behavior Absent/Managed):    shares suicidal thoughts    verbalizes safety check rationale  Patient appears to be responding to internal stimuli, patient said her stuffed pineapple is talking to her and she is not happy that she has to stay in her room. Pt said she feels safe having her SIO staff, denies SI,SIB at this time,  Patient likes to watch movies and musical shows, likes to talk a lot on the phone. She ate all her dinner and remained calm and cooperative with staff. Took scheduled her meds.  Patient requested for PRN trazodone for sleep.

## 2021-07-18 NOTE — PROGRESS NOTES
Pt appeared to have slept for 7 hours.  Breathing is even and unlabored.  No medical concerns/prns given.

## 2021-07-18 NOTE — PLAN OF CARE
"Music Therapy Group note    Total time in session: 45 minutes    Number of patients in group: 4    Scope of service: psychodynamic     Patient progress: improving     Intervention: Rosalie Analysis     Goal of group: self-reflection     Patient response/reaction to treatment intervention(s): Jacklyn reported \"really relating\" to the song \"Better than Today\" played for group today.  She was engaged, assertive and calm throughout.   Said that some songs (specifically about relationships) \"make me cry!\"  Group talked about crying being healing and positive.  She was engaged throughout session, singing along to the music at times.      Mary Beth Bassett, [facility]  Board-Certified Music Therapist           "
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
## 2021-07-19 VITALS
SYSTOLIC BLOOD PRESSURE: 125 MMHG | WEIGHT: 168.9 LBS | DIASTOLIC BLOOD PRESSURE: 72 MMHG | HEART RATE: 58 BPM | OXYGEN SATURATION: 97 % | RESPIRATION RATE: 16 BRPM | BODY MASS INDEX: 25.02 KG/M2 | TEMPERATURE: 98.5 F | HEIGHT: 69 IN

## 2021-07-19 PROCEDURE — 99232 SBSQ HOSP IP/OBS MODERATE 35: CPT | Performed by: CLINICAL NURSE SPECIALIST

## 2021-07-19 PROCEDURE — 250N000013 HC RX MED GY IP 250 OP 250 PS 637: Performed by: CLINICAL NURSE SPECIALIST

## 2021-07-19 PROCEDURE — G0177 OPPS/PHP; TRAIN & EDUC SERV: HCPCS

## 2021-07-19 PROCEDURE — 250N000013 HC RX MED GY IP 250 OP 250 PS 637: Performed by: PSYCHIATRY & NEUROLOGY

## 2021-07-19 PROCEDURE — 124N000002 HC R&B MH UMMC

## 2021-07-19 RX ORDER — GABAPENTIN 100 MG/1
100 CAPSULE ORAL 3 TIMES DAILY PRN
Status: DISCONTINUED | OUTPATIENT
Start: 2021-07-19 | End: 2021-07-20 | Stop reason: HOSPADM

## 2021-07-19 RX ADMIN — DOCUSATE SODIUM AND SENNOSIDES 1 TABLET: 8.6; 5 TABLET ORAL at 19:50

## 2021-07-19 RX ADMIN — ESCITALOPRAM OXALATE 10 MG: 10 TABLET ORAL at 08:10

## 2021-07-19 RX ADMIN — DOCUSATE SODIUM AND SENNOSIDES 1 TABLET: 8.6; 5 TABLET ORAL at 08:10

## 2021-07-19 RX ADMIN — TRAZODONE HYDROCHLORIDE 50 MG: 50 TABLET ORAL at 21:25

## 2021-07-19 ASSESSMENT — ACTIVITIES OF DAILY LIVING (ADL)
ORAL_HYGIENE: INDEPENDENT
HYGIENE/GROOMING: INDEPENDENT
DRESS: INDEPENDENT

## 2021-07-19 NOTE — PLAN OF CARE
Assessment/Intervention/Current Symtoms and Care Coordination    Complete AVS for discharge 7/20.    Discharge Plan or Goal  Psychiatry for medication management  DBT for emotional regulation    Barriers to Discharge   Discharge is scheduled for 7/20    Referral Status  Appointments scheduled and entered in AVS    Legal Status  Voluntary

## 2021-07-19 NOTE — PLAN OF CARE
Problem: Depression  Goal: Improved Mood  Outcome: Improving     Problem: Suicidal Behavior  Goal: Suicidal Behavior is Absent or Managed  Outcome: Improving  Flowsheets (Taken 7/19/2021 1831)  Mutually Determined Action Steps (Suicidal Behavior Absent/Managed): verbalizes safety check rationale    Patient is visible in the milieu, denies SI,SIB and hallucinations. Patient currently denies anxiety and depression she said she is just too excited to be discharged tomorrow. Patient had a shower this evening ate all her dinner and participated in groups. Took all her medications and remained calm and cooperative with staff.  Patient's boyfriend came to visit, the visit went well.  Patient requested for prn Trazodone for sleep.  Will continue to monitor.

## 2021-07-19 NOTE — PROGRESS NOTES
Pt appeared to have slept for 7 hours.  Breathing is even and unlabored.  No medical concerns/prns this shift.

## 2021-07-19 NOTE — PROGRESS NOTES
"[facility], [place]   Psychiatric Progress Note        Interim History:   The patient's care was discussed with the treatment team during the daily team meeting and/or staff's chart notes were reviewed.  Staff report patient is visible in the milieu and attends groups.     Psychiatric symptoms and interventions:   Upon interview with patient, better mood and denies suicidal ideation.    Reviewed 1:1 in treatment team meeting. Patient has been safe all weekend. She reports she can be safe and discussed coping skills she will use to keep herself safe. Discontinued 1:1.     Patient reports improvement in her mood. She denies suicidal ideation     Patient reports interest in DBT.     Patient will discharge on Tuesday.               Medications:       escitalopram  10 mg Oral Daily     senna-docusate  1 tablet Oral BID          Allergies:   No Known Allergies       Labs:   No results found for this or any previous visit (from the past 24 hour(s)).       Psychiatric Examination:     /67   Pulse 60   Temp 98.1  F (36.7  C) (Oral)   Resp 16   Ht 1.76 m (5' 9.29\")   Wt 76.6 kg (168 lb 14.4 oz)   LMP  (LMP Unknown)   SpO2 98%   Breastfeeding No   BMI 24.73 kg/m    Weight is 168 lbs 14.4 oz  Body mass index is 24.73 kg/m .  Orthostatic Vitals     None           Appearance: awake, alert, adequately groomed and dressed in hospital scrubs  Attitude:  evasive, guarded and uncooperative  Eye Contact:  poor   Mood:  angry, anxious and depressed  Affect:  intensity is flat  Speech:  normal prosody, sarcastic  Psychomotor Behavior:  no evidence of tardive dyskinesia, dystonia, or tics  Throught Process:  goal oriented  Associations:  no loose associations  Thought Content:  denies  suicidal ideation, no active intent  Insight:  fair  Judgement:  fair  Oriented to:  time, person, and place  Attention Span and Concentration:  intact  Recent and Remote Memory:  intact  Clinical Global " "Impressions  First:     Most recent:            Precautions:     Behavioral Orders   Procedures     Code 1 - Restrict to Unit     Routine Programming     As clinically indicated     Self Injury Precaution     Status Individual Observation     Patient SIO status reviewed with team/RN.  Please also refer to RN/team documentation for add'l detail.     -SIO staff to monitor following which have contributed to patient being on SIO:   Patient unable to contract for safety. SI, SIB   -Possible interventions SIO staff could use to support patient's treatment progress:   -When following observed, team will review discontinuation of SIO:   Pt displays calm and safe behaviors and can contract for safety on the unit     Order Specific Question:   CONTINUOUS 24 hours / day     Answer:   5 feet     Order Specific Question:   Indications for SIO     Answer:   Self-injury risk     Order Specific Question:   Indications for SIO     Answer:   Suicide risk     Suicide precautions     Patients on Suicide Precautions should have a Combination Diet ordered that includes a Diet selection(s) AND a Behavioral Tray selection for Safe Tray - with utensils, or Safe Tray - NO utensils            DIagnoses:   1.  Major depressive disorder, recurrent, severe, without psychosis.  2.  Borderline personality disorder.  3.  Cannabis use disorder.  4.  History of stimulant use disorder.         Plan:   Legal status: Voluntary      Medication management:   Patient is declining medications, \"I don't  like how I feel when I take them\".      Medical:  No acute issues.  Reviewed admission labs unremarkable.  HCG negative.  COVID screen negative.  EKG completed in the ED is normal.     Behavioral/psychology/social:   Encouraged patient to attend therapeutic programming as tolerated.   Treatment team discussed 1:1. Discontinued 1:1 . Patient reports she is safe . She has been safe all weekend.      Patient may  have stuffed pineapple in her room only. "      Disposition:   Reason for continued hospitalization: Patient has urges for self harm and suicidal thinking. She is on 1:1 for safety reasons.   Stabilization with medications, provide structured and supportive environment, groups  Estimated length of stay is 3-5 days   Disposition plan: return to home with IOP/DBT

## 2021-07-19 NOTE — PLAN OF CARE
"Problem: OT General Care Plan  Goal: OT Goal 1  Description: Pt will practice using >2 coping strategies to manage stress and reduce symptoms to demonstrate increased readiness for discharge.     Pt actively participated in occupational therapy clinic. Purpose of structured group: exploration/development of positive coping skills, engagement in creative expression and clinical observation of social, cognitive, and kinesthetic performance skills. Pt response: Pt started group checking in feeling \"good and safe\". Shared one passion of hers: \"helping people\". Chosen activity: dream catcher. Pt was able to ask for assistance as needed, and independently initiate self-selected task. Setup assist, demo and verbal cues provided for pt. Pt demonstrated good focus and distress tolerance for this complex task. Pt appeared comfortable interacting with peers/staff.     Pt participated in a structured occupational therapy session with a focus on group discussion surrounding self-care and wellness. Pt independently identified multiple examples of ways they engage in self-care which was broken into categories: physical, spiritual, emotional/mental, intellectual, and social. Pt identified \"physical health\" as a category of self-care that they feel successful in and \"mental health\" as one area they would like to work on. Pt demonstrated active listening and shared thoughtful contributions with other group members. Appeared to have an elevated mood and positive outlook on life today.    Outcome: Improving     "

## 2021-07-19 NOTE — PLAN OF CARE
Shift update: Jacklyn stated she's doing well today. Mood is up and denies depression, anxiety, SI or SIB. She requested to come off the 1:1 early in the morning. She said she will notify staff if thoughts of self harm increase and can not keep herself safe. She was cheerful on this unit and talkative. No comments about hearing voices and not responding. Participating in group and overall appropriate except for burping loudly and frequently after lunch for which needed redirecting    Mood/Affect: cheerful, bright, talkative    SI: denies     Hallucinations/Psychosis: denies    Activity/Participation: participated and appropriate in milieu and in group   PRN Meds: none   Appetite: good    Medical: none

## 2021-07-20 PROCEDURE — G0177 OPPS/PHP; TRAIN & EDUC SERV: HCPCS

## 2021-07-20 PROCEDURE — 250N000013 HC RX MED GY IP 250 OP 250 PS 637: Performed by: CLINICAL NURSE SPECIALIST

## 2021-07-20 PROCEDURE — 99239 HOSP IP/OBS DSCHRG MGMT >30: CPT | Performed by: CLINICAL NURSE SPECIALIST

## 2021-07-20 RX ORDER — GABAPENTIN 100 MG/1
100 CAPSULE ORAL 3 TIMES DAILY PRN
Qty: 90 CAPSULE | Refills: 1 | Status: SHIPPED | OUTPATIENT
Start: 2021-07-20

## 2021-07-20 RX ORDER — AMOXICILLIN 250 MG
1 CAPSULE ORAL 2 TIMES DAILY
Qty: 60 TABLET | Refills: 0 | Status: SHIPPED | OUTPATIENT
Start: 2021-07-20

## 2021-07-20 RX ORDER — ESCITALOPRAM OXALATE 10 MG/1
10 TABLET ORAL DAILY
Qty: 30 TABLET | Refills: 1 | Status: SHIPPED | OUTPATIENT
Start: 2021-07-21

## 2021-07-20 RX ADMIN — ESCITALOPRAM OXALATE 10 MG: 10 TABLET ORAL at 08:10

## 2021-07-20 RX ADMIN — DOCUSATE SODIUM AND SENNOSIDES 1 TABLET: 8.6; 5 TABLET ORAL at 08:10

## 2021-07-20 NOTE — DISCHARGE SUMMARY
"Psychiatric Discharge Summary    Jacklyn Ho MRN# [id number]   Age: 20 year old YOB: 2000     Date of Admission:  7/13/2021  Date of Discharge:  7/20/2021  Admitting Physician:  Rick Macias MD  Discharge Physician:  Debra A. Naegele, APRN CNS (Contact: [phone])         Event Leading to Hospitalization:   Jacklyn Slaughter is a 20-year-old -American female presenting with a history of mood disorder and suicidal ideation.  The patient is presenting after an overdose attempt with 10 tabs of 8 mg Rozerem.  The patient was recently hospitalized at [facility] from 06/09 to 06/16.  At that time she was started on Prozac 10 mg.  The patient states when she got home, she flushed it down the toilet.  She never used it. The patient states it made her feel \"not like myself.\"  The patient reports she has a plan to jump off the bridge.  The patient is reporting stressors of having difficulties with her boyfriend.       See Admission note by Naegele, Debra Ann, APRN CNS on 7/14/2021 for additional details                  DIagnoses:     1.  Major depressive disorder, recurrent, severe, without psychosis.  2.  Borderline personality disorder.  3.  Cannabis use disorder.  4.  History of stimulant use disorder.            Labs:     Results for orders placed or performed during the hospital encounter of 07/13/21   Comprehensive metabolic panel     Status: Abnormal   Result Value Ref Range    Sodium 139 133 - 144 mmol/L    Potassium 4.2 3.4 - 5.3 mmol/L    Chloride 110 (H) 94 - 109 mmol/L    Carbon Dioxide (CO2) 24 20 - 32 mmol/L    Anion Gap 5 3 - 14 mmol/L    Urea Nitrogen 13 7 - 30 mg/dL    Creatinine 1.04 0.52 - 1.04 mg/dL    Calcium 9.4 8.5 - 10.1 mg/dL    Glucose 82 70 - 99 mg/dL    Alkaline Phosphatase 69 40 - 150 U/L    AST 24 0 - 45 U/L    ALT 47 0 - 50 U/L    Protein Total 7.4 6.8 - 8.8 g/dL    Albumin 4.1 3.4 - 5.0 g/dL    Bilirubin Total 0.5 0.2 - 1.3 mg/dL    GFR Estimate 78 >60 " mL/min/1.73m2   Acetaminophen level     Status: Abnormal   Result Value Ref Range    Acetaminophen <2 (L) 10 - 30 mg/L   Salicylate level     Status: Normal   Result Value Ref Range    Salicylate <2 <20 mg/dL   HCG qualitative urine (UPT)     Status: Normal   Result Value Ref Range    hCG Urine Qualitative Negative Negative   CBC with platelets and differential     Status: None   Result Value Ref Range    WBC Count 7.2 4.0 - 11.0 10e3/uL    RBC Count 4.29 3.80 - 5.20 10e6/uL    Hemoglobin 13.1 11.7 - 15.7 g/dL    Hematocrit 39.5 35.0 - 47.0 %    MCV 92 78 - 100 fL    MCH 30.5 26.5 - 33.0 pg    MCHC 33.2 31.5 - 36.5 g/dL    RDW 12.1 10.0 - 15.0 %    Platelet Count 262 150 - 450 10e3/uL    % Neutrophils 64 %    % Lymphocytes 28 %    % Monocytes 8 %    % Eosinophils 0 %    % Basophils 0 %    % Immature Granulocytes 0 %    NRBCs per 100 WBC 0 <1 /100    Absolute Neutrophils 4.5 1.6 - 8.3 10e3/uL    Absolute Lymphocytes 2.1 0.8 - 5.3 10e3/uL    Absolute Monocytes 0.6 0.0 - 1.3 10e3/uL    Absolute Eosinophils 0.0 0.0 - 0.7 10e3/uL    Absolute Basophils 0.0 0.0 - 0.2 10e3/uL    Absolute Immature Granulocytes 0.0 <=0.0 10e3/uL    Absolute NRBCs 0.0 10e3/uL   Extra Blue Top Tube     Status: None   Result Value Ref Range    Hold Specimen [facility]    Extra Green Top (Lithium Heparin) Tube     Status: None   Result Value Ref Range    Hold Specimen [facility]    Drug abuse screen 1 urine (ED)     Status: Abnormal   Result Value Ref Range    Amphetamines Urine Screen Negative Screen Negative    Barbiturates Urine Screen Negative Screen Negative    Benzodiazepines Urine Screen Negative Screen Negative    Cannabinoids Urine Screen Positive (A) Screen Negative    Cocaine Urine Screen Negative Screen Negative    Opiates Urine Screen Negative Screen Negative   SARS-COV2 (COVID-19) Virus RT-PCR     Status: Normal    Specimen: Nasopharyngeal; Swab   Result Value Ref Range    SARS CoV2 PCR Negative Negative    Narrative    Testing was performed  using the arcelia  SARS-CoV-2 & Influenza A/B Assay on the arcelia  Glory  System.  This test should be ordered for the detection of SARS-COV-2 in individuals who meet SARS-CoV-2 clinical and/or epidemiological criteria. Test performance is unknown in asymptomatic patients.  This test is for in vitro diagnostic use under the FDA EUA for laboratories certified under CLIA to perform moderate and/or high complexity testing. This test has not been FDA cleared or approved.  A negative test does not rule out the presence of PCR inhibitors in the specimen or target RNA in concentration below the limit of detection for the assay. The possibility of a false negative should be considered if the patient's recent exposure or clinical presentation suggests COVID-19.  [facility] are certified under the Clinical Laboratory Improvement Amendments of 1988 (CLIA-88) as qualified to perform moderate and/or high complexity laboratory testing.   EKG 12-lead, tracing only     Status: None   Result Value Ref Range    Systolic Blood Pressure  mmHg    Diastolic Blood Pressure  mmHg    Ventricular Rate 55 BPM    Atrial Rate 55 BPM    OH Interval 150 ms    QRS Duration 80 ms     ms    QTc 376 ms    P Axis 61 degrees    R AXIS 46 degrees    T Axis 50 degrees    Interpretation ECG Click View Image link to view waveform and result    Asymptomatic COVID-19 Virus (Coronavirus) by PCR Nasopharyngeal     Status: Normal    Specimen: Nasopharyngeal; Swab    Narrative    The following orders were created for panel order Asymptomatic COVID-19 Virus (Coronavirus) by PCR Nasopharyngeal.  Procedure                               Abnormality         Status                     ---------                               -----------         ------                     SARS-COV2 (COVID-19) Vir...[[id number]]  Normal              Final result                 Please view results for these tests on the individual orders.   CBC with platelets  differential     Status: None    Narrative    The following orders were created for panel order CBC with platelets differential.  Procedure                               Abnormality         Status                     ---------                               -----------         ------                     CBC with platelets and d...[[id number]]                      Final result                 Please view results for these tests on the individual orders.   Urine Drugs of Abuse Screen     Status: Abnormal    Narrative    The following orders were created for panel order Urine Drugs of Abuse Screen.  Procedure                               Abnormality         Status                     ---------                               -----------         ------                     Drug abuse screen 1 urin...[[id number]]  Abnormal            Final result                 Please view results for these tests on the individual orders.   [place] Draw     Status: None    Narrative    The following orders were created for panel order [place] Draw.  Procedure                               Abnormality         Status                     ---------                               -----------         ------                     Extra Blue Top Tube[[id number]]                              Final result               Extra Green Top (Lithium...[[id number]]                      Final result                 Please view results for these tests on the individual orders.            Consults:   No consultations were requested during this admission         Hospital Course:   Jacklyn Ho was admitted to [facility] with attending Rodrigo Eugene MD as a voluntary patient. The patient was placed under status 15 (15 minute checks) to ensure patient safety.      Patient was on 1:1 during her hospital stay due to patient reporting she could not keep herself safe. Patient engaged in hitting her head on the wall one time. She did not engage in any other  "behaivors of  self harm.      Patient was started on Lexapro 10 mg to address depressive and anxiety symptoms. Patient reported she did not like Prozac because \"she felt different \". She reports flushing it down the toilet when she was discharged from [facility] in June 20201.   Patient used gabapentin for anxiety. Patient complained of constipation and it was thought that hydroxyzine being an anticholinergic medication could aggravate her constipation. Patient used Pericolace for constipation. Discussed risks, benefits and side effects of medications with patient     Medical:  Constipation.  Reviewed admission labs unremarkable.  HCG negative.  COVID screen negative.  EKG completed in the ED is normal.     Jacklyn Ho did participate in groups and was visible in the milieu. The patient's symptoms of suicidal ideation improved. Patient reported improved mood and denied suicidal thinking.     Provider discussed with patient putting her medications in a lock box so she has limited access to her medications. Provider recommended using a med minder and putting only a couple of days worth of medications to be available.,.     Today Jacklyn Ho reports no suicidal thinking. In addition, she has notable risk factors for self-harm, including age, single status, anxiety and previous suicide attempts. However, risk is mitigated by commitment to family, ability to volunteer a safety plan and history of seeking help when needed. Therefore, based on all available evidence including the factors cited above, she does not appear to be at imminent risk for self-harm, does not meet criteria for a 72-hr hold, and therefore remains appropriate for ongoing outpatient level of care. Voluntary referral for DBT was offered, she accepted this offer.     Jacklyn Ho was released to home. At the time of discharge Jacklyn Ho was determined to not be a danger to herself or others.          Discharge Medications: "     Current Discharge Medication List      START taking these medications    Details   escitalopram (LEXAPRO) 10 MG tablet Take 1 tablet (10 mg) by mouth daily  Qty: 30 tablet, Refills: 1    Associated Diagnoses: Chronic major depressive disorder, single episode; Anxiety      gabapentin (NEURONTIN) 100 MG capsule Take 1 capsule (100 mg) by mouth 3 times daily as needed (anxiety)  Qty: 90 capsule, Refills: 1    Associated Diagnoses: Anxiety      senna-docusate (SENOKOT-S/PERICOLACE) 8.6-50 MG tablet Take 1 tablet by mouth 2 times daily  Qty: 60 tablet, Refills: 0    Associated Diagnoses: Constipation, unspecified constipation type         CONTINUE these medications which have NOT CHANGED    Details   medroxyPROGESTERone (DEPO-PROVERA) 150 MG/ML IM injection Inject 150 mg into the muscle every 3 months      ramelteon (ROZEREM) 8 MG tablet Take 8 mg by mouth At Bedtime         STOP taking these medications       FLUoxetine (PROZAC) 10 MG capsule Comments:   Reason for Stopping:         hydrOXYzine (ATARAX) 25 MG tablet Comments:   Reason for Stopping:                    Psychiatric Examination:   Appearance:  awake, alert and adequately groomed  Attitude:  cooperative  Eye Contact:  good  Mood:  better  Affect:  appropriate and in normal range  Speech:  clear, coherent  Psychomotor Behavior:  no evidence of tardive dyskinesia, dystonia, or tics  Thought Process:  goal oriented  Associations:  no loose associations  Thought Content:  no evidence of suicidal ideation or homicidal ideation  Insight:  fair  Judgment:  fair  Oriented to:  time, person, and place  Attention Span and Concentration:  intact  Recent and Remote Memory:  intact  Language: Able to name objects, Able to repeat phrases and Able to read and write  Fund of Knowledge: appropriate  Muscle Strength and Tone: normal  Gait and Station: Normal         Discharge Plan:   Behavioral Discharge Planning and Instructions     Summary: You were admitted on  7/13/2021  due to Suicidal Ideations and Suicide Attempt.  You were treated by Debra Naegele APRN and discharged on 7/20/2021 from 4A to Home     Main Diagnosis:   1.  Major depressive disorder, recurrent, severe, without psychosis.  2.  Borderline personality disorder.  3.  Cannabis use disorder.  4.  History of stimulant use disorder.     Health Care Follow-up:   Psychiatry appointment: Wednesday, July 28 at 11:00 am  Psychiatrist: Shadia Hamlin PA-C  [facility]  [address] [place] [address], [address]  [place]  [address]  Phone: [phone], fax [phone]      DBT consists of a skills training group, individual therapy, and telephone coaching. The skills group focuses on teaching effective skills to help manage emotions and situations, while creating a life worth living. The skills are taught in four sections, called modules. Mindfulness, Interpersonal Effectiveness, Emotion Regulation, and Distress Tolerance are the areas of focus for the modules.    Goals of DBT are:  -Tolerating painful experiences  -Improving relationships  -Focusing on what is important to you  -Effectively managing emotional ups and downs     Dialectical Behavioral Therapy (DBT) Referral Services:     [facility]  Intake Appointment via telemedicine- Peyton Huang- August 4, 9AM.  They will call you a few days ahead and give you instructions.  This is a video visit but future therapy may be in person.  [address] [address]  [phone]     Attend all scheduled appointments with your outpatient providers. Call at least 24 hours in advance if you need to reschedule an appointment to ensure continued access to your outpatient providers.      Major Treatments, Procedures and Findings:  You were provided with: a psychiatric assessment, assessed for medical stability, medication evaluation and/or management, group therapy, milieu management and medical interventions     Symptoms to Report:  "feeling more aggressive, increased confusion, losing more sleep, mood getting worse or thoughts of suicide     Early warning signs can include: increased depression or anxiety sleep disturbances increased thoughts or behaviors of suicide or self-harm  increased unusual thinking, such as paranoia or hearing voices     Safety and Wellness:  Take all medicines as directed.  Make no changes unless your doctor suggests them.  Follow treatment recommendations.  Refrain from alcohol and non-prescribed drugs.  If there is a concern for safety, call 911.     Resources:   Crisis Intervention: 872.886.7993 or 045-094-3852 (TTY: 560.893.1857).  Call anytime for help.  National [place] on Mental Illness (www.mn.swapna.org): 621.506.8498 or 642-870-9130.  Suicide Awareness Voices of Education (SAVE) (www.save.org): 319-458-DVVL (6170)  National Suicide Prevention Line (www.mentalhealthmn.org): 021-479-SCYT (0282)  Mental Health Consumer/Survivor Network of [place] (www.mhcsn.net): 994.702.4324 or 958-077-7335  [facility] Crisis Response - Adult [phone]  Text 4 Life: txt \"LIFE\" to 84740 for immediate support and crisis intervention     General Medication Instructions:   See your medication sheet(s) for instructions.   Take all medicines as directed.  Make no changes unless your doctor suggests them.   Go to all your doctor visits.  Be sure to have all your required lab tests. This way, your medicines can be refilled on time.  Do not use any drugs not prescribed by your doctor.  Avoid alcohol.     Advance Directives:   Scanned document on file with [facility]? No scanned doc  Is document scanned? No. Copy Requested.  Honoring Choices Your Rights Handout: Informed and given  Was more information offered? Pt declined     The Treatment team has appreciated the opportunity to work with you. If you have any questions or concerns about your recent admission, you can contact the unit which can receive your call 24 hours a day, 7 days a " week. They will be able to get in touch with a Provider if needed. The unit number is [phone] .            Attestation:  The patient has been seen and evaluated by me,  Debra A. Naegele, APRN CNS on 7/20/2021  Discharge summary time > 30 minutes

## 2021-07-20 NOTE — PLAN OF CARE
"Patient has been up and visible.  Full range, smiling.  Reports feeling \"happy\".  Feels safe for discharge.  Denies SI/SIB ors thoughts to hurt others.  Feels her emotions are manageable.  Awaiting discharge medications.  Has arranged private transportation.  1400: acknowledged understanding of discharge instructions and follow-up.     "

## 2021-07-20 NOTE — PROGRESS NOTES
Number of patients attending the group:  7  Group Length:  1 Hour     Group Therapy      Summary of Group / Topics Discussed:  The psychotherapy group goal is to promote insight to positive choice and change. Group processing is within a supportive and safe environment. Patients processed emotions using verbal group and expressive psychotherapy interventions.  This group focused on self-reflections and brainstorming on possible precipitating factors of current mental health symptoms, and ways to address the identified factors. The group centered on depression, anxiety and insight orientation. Each patient identified one pertinent symptom, discussed ways such symptom has impacted their functioning. Received feedback from group members on possible strategies for addressing the factors. During the group, each patient was allowed the time to brainstorm and proffer individually-driven solutions, in addition to group feedback.     Assessment: This patient participated in the group and provided feedback to other group members. She also accepted feedback provided by other group members. She presented with a bright affect- reporting she is feeling better compared to when she was admitted. Pt participated in brainstorming and finding healthy ways to cope with depressive or anxiety symptoms and how to enhance insight into one's mental health.      Patient Response: Pt received feedback from her peers and this writer on ways to cope with depressive and anxiety thoughts and insight orientation. Pt voiced understanding of the interventions discussed. Reported willingness to practice the skills reviewed.

## 2021-07-20 NOTE — DISCHARGE INSTRUCTIONS
Behavioral Discharge Planning and Instructions    Summary: You were admitted on 7/13/2021  due to Suicidal Ideations and Suicide Attempt.  You were treated by Debra Naegele APRN and discharged on 7/20/2021 from  to Home    Main Diagnosis:   1.  Major depressive disorder, recurrent, severe, without psychosis.  2.  Borderline personality disorder.  3.  Cannabis use disorder.  4.  History of stimulant use disorder.    Health Care Follow-up:   Psychiatry appointment: Wednesday, July 28 at 11:00 am  Psychiatrist: Shadia Hamlin PA-C  [facility]  [address] [place] [address], [address]  [place]  [address]  Phone: [phone], fax [phone]     DBT consists of a skills training group, individual therapy, and telephone coaching. The skills group focuses on teaching effective skills to help manage emotions and situations, while creating a life worth living. The skills are taught in four sections, called modules. Mindfulness, Interpersonal Effectiveness, Emotion Regulation, and Distress Tolerance are the areas of focus for the modules.    Goals of DBT are:  -Tolerating painful experiences  -Improving relationships  -Focusing on what is important to you  -Effectively managing emotional ups and downs    Dialectical Behavioral Therapy (DBT) Referral Services:    [facility]  Intake Appointment via telemedicine- Peyton Huang- August 4, 9AM.  They will call you a few days ahead and give you instructions.  This is a video visit but future therapy may be in person.  [address] [address] [phone]    Attend all scheduled appointments with your outpatient providers. Call at least 24 hours in advance if you need to reschedule an appointment to ensure continued access to your outpatient providers.     Major Treatments, Procedures and Findings:  You were provided with: a psychiatric assessment, assessed for medical stability, medication evaluation and/or management, group  "therapy, milieu management and medical interventions    Symptoms to Report: feeling more aggressive, increased confusion, losing more sleep, mood getting worse or thoughts of suicide    Early warning signs can include: increased depression or anxiety sleep disturbances increased thoughts or behaviors of suicide or self-harm  increased unusual thinking, such as paranoia or hearing voices    Safety and Wellness:  Take all medicines as directed.  Make no changes unless your doctor suggests them.  Follow treatment recommendations.  Refrain from alcohol and non-prescribed drugs.  If there is a concern for safety, call 911.    Resources:   Crisis Intervention: 843.479.2315 or 510-197-7586 (TTY: 326.510.8745).  Call anytime for help.  National [place] on Mental Illness (www.mn.swapna.org): 598.129.2899 or 138-013-7046.  Suicide Awareness Voices of Education (SAVE) (www.save.org): 846-010-LRWO (4017)  National Suicide Prevention Line (www.mentalUseful Systemsmn.org): 932-649-WEQD (1326)  Mental Health Consumer/Survivor Network of [place] (www.mhcsn.net): 533.734.4590 or 161-766-7208  [facility] Crisis Response - Adult 633 974-6305  Text 4 Life: txt \"LIFE\" to 23470 for immediate support and crisis intervention    General Medication Instructions:   See your medication sheet(s) for instructions.   Take all medicines as directed.  Make no changes unless your doctor suggests them.   Go to all your doctor visits.  Be sure to have all your required lab tests. This way, your medicines can be refilled on time.  Do not use any drugs not prescribed by your doctor.  Avoid alcohol.    Advance Directives:   Scanned document on file with [email or web address]? No scanned doc  Is document scanned? No. Copy Requested.  Honoring Choices Your Rights Handout: Informed and given  Was more information offered? Pt declined    The Treatment team has appreciated the opportunity to work with you. If you have any questions or concerns about your recent admission, you can " contact the unit which can receive your call 24 hours a day, 7 days a week. They will be able to get in touch with a Provider if needed. The unit number is [phone] .

## 2021-07-20 NOTE — PROGRESS NOTES
Behavioral Health  Note    Behavioral Health  Spirituality Group Note    UNIT [facility] [place]    Name: Jacklyn Ho YOB: 2000   MRN: [id number] Age: 20 year old      Patient attended -led group, which included discussion of spirituality, coping with illness and building resilience.    Patient attended group for NC hrs.    patient minimally participated, but was respectful to the group process.      Linda Teague  Chaplain Resident  Pager: [phone]

## 2021-07-20 NOTE — PROGRESS NOTES
Pt appeared to have slept for8 hours.  Breathing is even and unlabored.  No medical concerns/prns this shift.

## 2021-07-21 ENCOUNTER — PATIENT OUTREACH (OUTPATIENT)
Dept: CARE COORDINATION | Facility: CLINIC | Age: 21
End: 2021-07-21

## 2021-07-21 DIAGNOSIS — Z71.89 OTHER SPECIFIED COUNSELING: ICD-10-CM

## 2021-07-21 NOTE — PROGRESS NOTES
Clinic Care Coordination Contact  [facility]/Voicemail    Clinical Data: Care Coordinator Outreach- Transition Call Management    Outreach attempted x 1.  Unable to leave message as voicemail not set.     Plan: [facility] team will try to reach patient again in 1-2 business days.    Sana Arreguin, RN  [facility], [facility]

## 2021-07-21 NOTE — PROGRESS NOTES
Clinic Care Coordination Contact  Clinic Care Coordination Contact  [facility]/Voicemail    Clinical Data: Care Coordinator Outreach- Transition Call Management    Outreach attempted x 2.  Unable to leave message as voicemail not set.     Plan: [facility] team will do no further outreaches at this time.    Sana Arreguin, RN  [facility] Resource [place], [facility]
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
## 2021-10-11 ENCOUNTER — HEALTH MAINTENANCE LETTER (OUTPATIENT)
Age: 21
End: 2021-10-11

## 2022-07-17 ENCOUNTER — HEALTH MAINTENANCE LETTER (OUTPATIENT)
Age: 22
End: 2022-07-17

## 2022-09-25 ENCOUNTER — HEALTH MAINTENANCE LETTER (OUTPATIENT)
Age: 22
End: 2022-09-25

## 2023-08-05 ENCOUNTER — HEALTH MAINTENANCE LETTER (OUTPATIENT)
Age: 23
End: 2023-08-05

## 2024-01-13 ENCOUNTER — LAB REQUISITION (OUTPATIENT)
Dept: LAB | Facility: HOSPITAL | Age: 24
End: 2024-01-13

## 2024-01-13 PROCEDURE — 36415 COLL VENOUS BLD VENIPUNCTURE: CPT | Performed by: INTERNAL MEDICINE

## 2024-01-13 PROCEDURE — 86481 TB AG RESPONSE T-CELL SUSP: CPT | Performed by: INTERNAL MEDICINE

## 2024-01-15 LAB
GAMMA INTERFERON BACKGROUND BLD IA-ACNC: 0.05 IU/ML
M TB IFN-G BLD-IMP: NEGATIVE
M TB IFN-G CD4+ BCKGRND COR BLD-ACNC: 9.95 IU/ML
MITOGEN IGNF BCKGRD COR BLD-ACNC: 0 IU/ML
MITOGEN IGNF BCKGRD COR BLD-ACNC: 0.01 IU/ML
QUANTIFERON MITOGEN: 10 IU/ML
QUANTIFERON NIL TUBE: 0.05 IU/ML
QUANTIFERON TB1 TUBE: 0.05 IU/ML
QUANTIFERON TB2 TUBE: 0.06

## 2024-07-26 ENCOUNTER — TELEPHONE (OUTPATIENT)
Dept: NURSING | Facility: CLINIC | Age: 24
End: 2024-07-26
Payer: COMMERCIAL

## 2024-07-26 NOTE — TELEPHONE ENCOUNTER
Pre-Medication  Assessment:    Jacklyn Ho    When was the first day of your last period? 24Patient is sure of LMP date.    When was your positive pregnancy test?        Was the test more than 52 days ago (before 24)? No    Were you using hormonal birth control, an IUD or emergency contraception when you got pregnant? No    Have you ever been diagnosed with an ectopic pregnancy? No    Have you ever had a tubal ligation or sterilization procedure? No    Have you ever been diagnosed with pelvic inflammatory disease? No    Are you having one-sided pelvic pain? No    How long are your typical menstrual cycles /  How many days from the start of one period to the start of the next one?       During the past 3 months, has the time between periods varied from your typical cycles by more than a few days? No, cycles have been regular.    Was LMP more than 10 weeks (70 days) ago (before 24)? No    Did your last period start earlier or later than you would have expected? No    Was your last period shorter than usual? No    Was the amount of bleeding/cramping in your last period normal for you? Yes    Have you had any other recent bleeding that was not normal for you? No      Have you ever been diagnosed with:    An allergy or intolerance to mifepristone or misoprostol?  No    Chronic renal failure?  No    Hemorrhagic disorders? No    Inherited porphyria? No    Have you used systemic corticosteroid therapy long term?  No    Are you currently on anticoagulation therapy (excluding aspirin)?  No    Based on the responses given by the patient, an ultrasound is not indicated.    Patient denies all contraindications, will proceed with scheduling medication termination of pregnancy appointment    Pt wants to have surgery for TOP not MTOP.  Per US pt is 6w+1.  Pt given PP and [facility] resources      Milagro Lamb, RN       D&C/D&E at [facility]     [facility] (up to 23w6d by best dates)  [phone]?     (Cher Da Silva, : [phone])     [facility] (up to 18w Phone: [phone]     [facility] ([facility]) up to 17 weeks [phone]